# Patient Record
Sex: MALE | Race: WHITE | NOT HISPANIC OR LATINO | Employment: FULL TIME | URBAN - METROPOLITAN AREA
[De-identification: names, ages, dates, MRNs, and addresses within clinical notes are randomized per-mention and may not be internally consistent; named-entity substitution may affect disease eponyms.]

---

## 2017-02-28 ENCOUNTER — ALLSCRIPTS OFFICE VISIT (OUTPATIENT)
Dept: OTHER | Facility: OTHER | Age: 57
End: 2017-02-28

## 2017-10-17 ENCOUNTER — GENERIC CONVERSION - ENCOUNTER (OUTPATIENT)
Dept: OTHER | Facility: OTHER | Age: 57
End: 2017-10-17

## 2018-01-11 NOTE — MISCELLANEOUS
Message   Recorded as Task   Date: 10/27/2016 03:25 PM, Created By: Blessing Cleveland   Task Name: Follow Up   Assigned To: Abel Rodirguez   Regarding Patient: Jonh Asher, Status: Active   Comment:    Starr Sr - 27 Oct 2016 3:25 PM     TASK CREATED  Caller: Self; Other; [phone] (Home); [phone] (Work)  Juan A Gibson stopped in and would like his current medication changed  The prescription is Levofloxacin 500 mg    He has read the side effects and is not comfortable taking this medication    [facility]  please call Flori Cobb [phone] when ready or if you have any questions   Jenny Weinstein - 27 Oct 2016 3:50 PM     TASK REASSIGNED: Previously Assigned To [address] [place] [address] Giancarlo Mmis      please advise   ac/Abel Ibarra - 27 Oct 2016 10:32 PM     TASK EDITED                 changed        Plan  Acute maxillary sinusitis, recurrence not specified    · LevoFLOXacin 500 MG Oral Tablet   · Cefdinir 300 MG Oral Capsule; TAKE 2 CAPSULES DAILY    Signatures   Electronically signed by : Isabel Hannah DO; Oct 27 2016 10:33PM EST                       (Author)

## 2018-01-13 VITALS
HEIGHT: 71 IN | BODY MASS INDEX: 23.94 KG/M2 | HEART RATE: 76 BPM | RESPIRATION RATE: 18 BRPM | DIASTOLIC BLOOD PRESSURE: 82 MMHG | SYSTOLIC BLOOD PRESSURE: 130 MMHG | TEMPERATURE: 98.7 F | WEIGHT: 171 LBS

## 2018-02-08 PROBLEM — J39.2 THROAT DISORDER: Status: ACTIVE | Noted: 2017-02-28

## 2018-02-08 RX ORDER — BUPROPION HYDROCHLORIDE 150 MG/1
TABLET, EXTENDED RELEASE ORAL
COMMUNITY
Start: 2017-02-28 | End: 2018-02-09

## 2018-02-08 RX ORDER — RANITIDINE 150 MG/1
TABLET ORAL
COMMUNITY
Start: 2017-02-28 | End: 2018-02-09

## 2018-02-09 ENCOUNTER — OFFICE VISIT (OUTPATIENT)
Dept: FAMILY MEDICINE CLINIC | Facility: CLINIC | Age: 58
End: 2018-02-09
Payer: COMMERCIAL

## 2018-02-09 VITALS
TEMPERATURE: 97.8 F | WEIGHT: 184 LBS | HEART RATE: 80 BPM | DIASTOLIC BLOOD PRESSURE: 82 MMHG | SYSTOLIC BLOOD PRESSURE: 122 MMHG | RESPIRATION RATE: 20 BRPM | BODY MASS INDEX: 25.76 KG/M2 | HEIGHT: 71 IN

## 2018-02-09 DIAGNOSIS — I10 ESSENTIAL HYPERTENSION: ICD-10-CM

## 2018-02-09 DIAGNOSIS — Z13.1 SCREENING FOR DIABETES MELLITUS (DM): ICD-10-CM

## 2018-02-09 DIAGNOSIS — I25.110 CORONARY ARTERY DISEASE INVOLVING NATIVE CORONARY ARTERY OF NATIVE HEART WITH UNSTABLE ANGINA PECTORIS (HCC): ICD-10-CM

## 2018-02-09 DIAGNOSIS — Z12.5 PROSTATE CANCER SCREENING: ICD-10-CM

## 2018-02-09 DIAGNOSIS — E78.5 HYPERLIPIDEMIA LDL GOAL <100: ICD-10-CM

## 2018-02-09 DIAGNOSIS — K21.9 GASTROESOPHAGEAL REFLUX DISEASE, ESOPHAGITIS PRESENCE NOT SPECIFIED: Primary | ICD-10-CM

## 2018-02-09 DIAGNOSIS — Z12.11 COLON CANCER SCREENING: ICD-10-CM

## 2018-02-09 PROBLEM — I25.10 CORONARY ARTERY DISEASE INVOLVING NATIVE CORONARY ARTERY: Status: ACTIVE | Noted: 2018-02-09

## 2018-02-09 PROCEDURE — 3079F DIAST BP 80-89 MM HG: CPT | Performed by: FAMILY MEDICINE

## 2018-02-09 PROCEDURE — 99214 OFFICE O/P EST MOD 30 MIN: CPT | Performed by: FAMILY MEDICINE

## 2018-02-09 PROCEDURE — 3725F SCREEN DEPRESSION PERFORMED: CPT | Performed by: FAMILY MEDICINE

## 2018-02-09 PROCEDURE — 3074F SYST BP LT 130 MM HG: CPT | Performed by: FAMILY MEDICINE

## 2018-02-09 PROCEDURE — 3008F BODY MASS INDEX DOCD: CPT | Performed by: FAMILY MEDICINE

## 2018-02-09 RX ORDER — ASPIRIN 81 MG/1
81 TABLET ORAL DAILY
COMMUNITY

## 2018-02-09 RX ORDER — LISINOPRIL 2.5 MG/1
2.5 TABLET ORAL DAILY
COMMUNITY

## 2018-02-09 RX ORDER — CLOPIDOGREL BISULFATE 75 MG/1
75 TABLET ORAL DAILY
COMMUNITY
End: 2021-12-17 | Stop reason: ALTCHOICE

## 2018-02-09 RX ORDER — METOPROLOL TARTRATE 50 MG/1
50 TABLET, FILM COATED ORAL DAILY
COMMUNITY
End: 2021-12-17

## 2018-02-09 RX ORDER — PRAVASTATIN SODIUM 40 MG
40 TABLET ORAL DAILY
Status: ON HOLD | COMMUNITY
End: 2018-05-11

## 2018-02-09 RX ORDER — RANITIDINE 150 MG/1
150 CAPSULE ORAL 2 TIMES DAILY
Qty: 180 CAPSULE | Refills: 1 | Status: SHIPPED | OUTPATIENT
Start: 2018-02-09 | End: 2021-12-17

## 2018-02-09 NOTE — PROGRESS NOTES
Assessment/Plan:      Cad new/stable  Has stents  gerd new  Diet advised, start H2B, possible PPI and EGD in future  htn stable  Needs screening labs with f/u suggested  Get old labs from Dr Juan Boston for comparison     Diagnoses and all orders for this visit:    Gastroesophageal reflux disease, esophagitis presence not specified  -     ranitidine (ZANTAC) 150 MG capsule; Take 1 capsule (150 mg total) by mouth 2 (two) times a day  -     Ambulatory referral to Gastroenterology; Future    Hyperlipidemia LDL goal <100  -     pravastatin (PRAVACHOL) 40 mg tablet; Take 40 mg by mouth daily    Essential hypertension  -     lisinopril (ZESTRIL) 2 5 mg tablet; Take 2 5 mg by mouth daily  -     metoprolol tartrate (LOPRESSOR) 50 mg tablet; Take 50 mg by mouth every 12 (twelve) hours  -     Comprehensive metabolic panel; Future  -     Comprehensive metabolic panel    Coronary artery disease involving native coronary artery of native heart with unstable angina pectoris (HCC)  -     aspirin (ECOTRIN LOW STRENGTH) 81 mg EC tablet; Take 81 mg by mouth daily  -     clopidogrel (PLAVIX) 75 mg tablet; Take 75 mg by mouth daily  -     Comprehensive metabolic panel; Future  -     Comprehensive metabolic panel    Screening for diabetes mellitus (DM)  -     Comprehensive metabolic panel; Future  -     Hemoglobin A1c; Future  -     Comprehensive metabolic panel  -     Hemoglobin A1c    Prostate cancer screening  -     PSA, ultrasensitive; Future  -     PSA, ultrasensitive    Colon cancer screening  -     Ambulatory referral to Gastroenterology; Future    Other orders  -     Discontinue: buPROPion (ZYBAN) 150 MG 12 hr tablet; Take by mouth  -     Discontinue: ranitidine (ZANTAC) 150 mg tablet; Take by mouth        Recent data suggesting increased risk of ischemic CVA and chronic kidney damage with PPI use was advised  Possible interaction with plavix advised also      Return in about 4 weeks (around 3/9/2018) for Recheck      Subjective: Patient ID: Eneida Gold is a 56 y o  male  Chief Complaint   Patient presents with    Follow-up     on medications       Labs done by cardio  Improving per pt  No colonoscopy  No recent psa  Cad stable  htn stable  No myalgias on statin    About 4m  Since MI  gerd sx  tums w/o help  No blood in stool  No nausea  Wakes him at hs  Vomit once  No wt loss            The following portions of the patient's history were reviewed and updated as appropriate: allergies, current medications, past family history, past medical history, past social history, past surgical history and problem list     Review of Systems   Constitutional: Positive for chills  Negative for fatigue and unexpected weight change  Gastrointestinal: Negative for abdominal distention, abdominal pain and blood in stool  Endocrine: Positive for cold intolerance  Current Outpatient Prescriptions   Medication Sig Dispense Refill    aspirin (ECOTRIN LOW STRENGTH) 81 mg EC tablet Take 81 mg by mouth daily      clopidogrel (PLAVIX) 75 mg tablet Take 75 mg by mouth daily      lisinopril (ZESTRIL) 2 5 mg tablet Take 2 5 mg by mouth daily      metoprolol tartrate (LOPRESSOR) 50 mg tablet Take 50 mg by mouth every 12 (twelve) hours      pravastatin (PRAVACHOL) 40 mg tablet Take 40 mg by mouth daily      ranitidine (ZANTAC) 150 MG capsule Take 1 capsule (150 mg total) by mouth 2 (two) times a day 180 capsule 1     No current facility-administered medications for this visit  Objective:    /82 (BP Location: Left arm, Patient Position: Sitting)   Pulse 80   Temp 97 8 °F (36 6 °C)   Resp 20   Ht 5' 11" (1 803 m)   Wt 83 5 kg (184 lb)   BMI 25 66 kg/m²        Physical Exam   Constitutional: He appears well-developed  HENT:   Head: Normocephalic  Eyes: Conjunctivae are normal    Neck: Neck supple  Cardiovascular: Normal rate and intact distal pulses  Pulmonary/Chest: Effort normal  No respiratory distress     Abdominal: Soft    Musculoskeletal: He exhibits no edema or deformity  Neurological: He is alert  Skin: Skin is warm and dry  Psychiatric: His behavior is normal  Thought content normal    Nursing note and vitals reviewed               Yetta Comings, DO

## 2018-05-11 ENCOUNTER — APPOINTMENT (EMERGENCY)
Dept: RADIOLOGY | Facility: HOSPITAL | Age: 58
End: 2018-05-11
Payer: COMMERCIAL

## 2018-05-11 ENCOUNTER — APPOINTMENT (OUTPATIENT)
Dept: RADIOLOGY | Facility: HOSPITAL | Age: 58
End: 2018-05-11
Payer: COMMERCIAL

## 2018-05-11 ENCOUNTER — APPOINTMENT (OUTPATIENT)
Dept: NON INVASIVE DIAGNOSTICS | Facility: HOSPITAL | Age: 58
End: 2018-05-11
Payer: COMMERCIAL

## 2018-05-11 ENCOUNTER — HOSPITAL ENCOUNTER (OUTPATIENT)
Facility: HOSPITAL | Age: 58
Setting detail: OBSERVATION
Discharge: HOME | End: 2018-05-11
Attending: EMERGENCY MEDICINE | Admitting: STUDENT IN AN ORGANIZED HEALTH CARE EDUCATION/TRAINING PROGRAM
Payer: COMMERCIAL

## 2018-05-11 VITALS
OXYGEN SATURATION: 99 % | HEIGHT: 71 IN | HEART RATE: 63 BPM | SYSTOLIC BLOOD PRESSURE: 118 MMHG | WEIGHT: 176.81 LBS | RESPIRATION RATE: 18 BRPM | TEMPERATURE: 98.1 F | BODY MASS INDEX: 24.75 KG/M2 | DIASTOLIC BLOOD PRESSURE: 78 MMHG

## 2018-05-11 DIAGNOSIS — I25.110 CORONARY ARTERY DISEASE INVOLVING NATIVE CORONARY ARTERY OF NATIVE HEART WITH UNSTABLE ANGINA PECTORIS (HCC): Primary | ICD-10-CM

## 2018-05-11 DIAGNOSIS — R07.9 CHEST PAIN: ICD-10-CM

## 2018-05-11 DIAGNOSIS — E78.5 HYPERLIPIDEMIA LDL GOAL <100: ICD-10-CM

## 2018-05-11 PROBLEM — R07.89 ATYPICAL CHEST PAIN: Status: ACTIVE | Noted: 2018-05-11

## 2018-05-11 LAB
ALBUMIN SERPL BCP-MCNC: 3.8 G/DL (ref 3.5–5)
ALP SERPL-CCNC: 62 U/L (ref 46–116)
ALT SERPL W P-5'-P-CCNC: 43 U/L (ref 12–78)
ANION GAP SERPL CALCULATED.3IONS-SCNC: 10 MMOL/L (ref 4–13)
ANION GAP SERPL CALCULATED.3IONS-SCNC: 8 MMOL/L (ref 4–13)
AST SERPL W P-5'-P-CCNC: 20 U/L (ref 5–45)
BASOPHILS # BLD AUTO: 0 THOUSANDS/ΜL (ref 0–0.1)
BASOPHILS # BLD AUTO: 0.1 THOUSANDS/ΜL (ref 0–0.1)
BASOPHILS NFR BLD AUTO: 0 % (ref 0–1)
BASOPHILS NFR BLD AUTO: 1 % (ref 0–1)
BILIRUB SERPL-MCNC: 0.3 MG/DL (ref 0.2–1)
BUN SERPL-MCNC: 17 MG/DL (ref 5–25)
BUN SERPL-MCNC: 19 MG/DL (ref 5–25)
CALCIUM SERPL-MCNC: 9 MG/DL (ref 8.3–10.1)
CALCIUM SERPL-MCNC: 9.4 MG/DL (ref 8.3–10.1)
CHLORIDE SERPL-SCNC: 102 MMOL/L (ref 100–108)
CHLORIDE SERPL-SCNC: 102 MMOL/L (ref 100–108)
CO2 SERPL-SCNC: 26 MMOL/L (ref 21–32)
CO2 SERPL-SCNC: 29 MMOL/L (ref 21–32)
CREAT SERPL-MCNC: 1.03 MG/DL (ref 0.6–1.3)
CREAT SERPL-MCNC: 1.15 MG/DL (ref 0.6–1.3)
EOSINOPHIL # BLD AUTO: 0.4 THOUSAND/ΜL (ref 0–0.61)
EOSINOPHIL # BLD AUTO: 0.5 THOUSAND/ΜL (ref 0–0.61)
EOSINOPHIL NFR BLD AUTO: 5 % (ref 0–6)
EOSINOPHIL NFR BLD AUTO: 5 % (ref 0–6)
ERYTHROCYTE [DISTWIDTH] IN BLOOD BY AUTOMATED COUNT: 14.6 % (ref 11.6–15.1)
ERYTHROCYTE [DISTWIDTH] IN BLOOD BY AUTOMATED COUNT: 14.7 % (ref 11.6–15.1)
GFR SERPL CREATININE-BSD FRML MDRD: 71 ML/MIN/1.73SQ M
GFR SERPL CREATININE-BSD FRML MDRD: 81 ML/MIN/1.73SQ M
GLUCOSE P FAST SERPL-MCNC: 89 MG/DL (ref 65–99)
GLUCOSE SERPL-MCNC: 111 MG/DL (ref 65–140)
GLUCOSE SERPL-MCNC: 89 MG/DL (ref 65–140)
HCT VFR BLD AUTO: 42.2 % (ref 42–52)
HCT VFR BLD AUTO: 44.2 % (ref 42–52)
HGB BLD-MCNC: 13.7 G/DL (ref 14–18)
HGB BLD-MCNC: 14.3 G/DL (ref 14–18)
LYMPHOCYTES # BLD AUTO: 2.6 THOUSANDS/ΜL (ref 0.6–4.47)
LYMPHOCYTES # BLD AUTO: 3.6 THOUSANDS/ΜL (ref 0.6–4.47)
LYMPHOCYTES NFR BLD AUTO: 30 % (ref 14–44)
LYMPHOCYTES NFR BLD AUTO: 35 % (ref 14–44)
MCH RBC QN AUTO: 30.2 PG (ref 27–31)
MCH RBC QN AUTO: 30.2 PG (ref 27–31)
MCHC RBC AUTO-ENTMCNC: 32.3 G/DL (ref 31.4–37.4)
MCHC RBC AUTO-ENTMCNC: 32.4 G/DL (ref 31.4–37.4)
MCV RBC AUTO: 93 FL (ref 82–98)
MCV RBC AUTO: 93 FL (ref 82–98)
MONOCYTES # BLD AUTO: 0.8 THOUSAND/ΜL (ref 0.17–1.22)
MONOCYTES # BLD AUTO: 0.8 THOUSAND/ΜL (ref 0.17–1.22)
MONOCYTES NFR BLD AUTO: 8 % (ref 4–12)
MONOCYTES NFR BLD AUTO: 9 % (ref 4–12)
NEUTROPHILS # BLD AUTO: 4.7 THOUSANDS/ΜL (ref 1.85–7.62)
NEUTROPHILS # BLD AUTO: 5.2 THOUSANDS/ΜL (ref 1.85–7.62)
NEUTS SEG NFR BLD AUTO: 51 % (ref 43–75)
NEUTS SEG NFR BLD AUTO: 55 % (ref 43–75)
NRBC BLD AUTO-RTO: 0 /100 WBCS
NRBC BLD AUTO-RTO: 0 /100 WBCS
PLATELET # BLD AUTO: 309 THOUSANDS/UL (ref 130–400)
PLATELET # BLD AUTO: 353 THOUSANDS/UL (ref 130–400)
PMV BLD AUTO: 7.4 FL (ref 8.9–12.7)
PMV BLD AUTO: 7.5 FL (ref 8.9–12.7)
POTASSIUM SERPL-SCNC: 3.5 MMOL/L (ref 3.5–5.3)
POTASSIUM SERPL-SCNC: 3.7 MMOL/L (ref 3.5–5.3)
PROT SERPL-MCNC: 7.7 G/DL (ref 6.4–8.2)
RBC # BLD AUTO: 4.52 MILLION/UL (ref 4.7–6.1)
RBC # BLD AUTO: 4.76 MILLION/UL (ref 4.7–6.1)
SODIUM SERPL-SCNC: 138 MMOL/L (ref 136–145)
SODIUM SERPL-SCNC: 139 MMOL/L (ref 136–145)
TROPONIN I SERPL-MCNC: <0.02 NG/ML
WBC # BLD AUTO: 10.2 THOUSAND/UL (ref 4.8–10.8)
WBC # BLD AUTO: 8.6 THOUSAND/UL (ref 4.8–10.8)

## 2018-05-11 PROCEDURE — 85025 COMPLETE CBC W/AUTO DIFF WBC: CPT | Performed by: STUDENT IN AN ORGANIZED HEALTH CARE EDUCATION/TRAINING PROGRAM

## 2018-05-11 PROCEDURE — 36415 COLL VENOUS BLD VENIPUNCTURE: CPT | Performed by: EMERGENCY MEDICINE

## 2018-05-11 PROCEDURE — 71045 X-RAY EXAM CHEST 1 VIEW: CPT

## 2018-05-11 PROCEDURE — 99285 EMERGENCY DEPT VISIT HI MDM: CPT

## 2018-05-11 PROCEDURE — A9502 TC99M TETROFOSMIN: HCPCS

## 2018-05-11 PROCEDURE — 80048 BASIC METABOLIC PNL TOTAL CA: CPT | Performed by: STUDENT IN AN ORGANIZED HEALTH CARE EDUCATION/TRAINING PROGRAM

## 2018-05-11 PROCEDURE — 80053 COMPREHEN METABOLIC PANEL: CPT | Performed by: EMERGENCY MEDICINE

## 2018-05-11 PROCEDURE — 99204 OFFICE O/P NEW MOD 45 MIN: CPT | Performed by: INTERNAL MEDICINE

## 2018-05-11 PROCEDURE — 93005 ELECTROCARDIOGRAM TRACING: CPT

## 2018-05-11 PROCEDURE — 85025 COMPLETE CBC W/AUTO DIFF WBC: CPT | Performed by: EMERGENCY MEDICINE

## 2018-05-11 PROCEDURE — 93017 CV STRESS TEST TRACING ONLY: CPT

## 2018-05-11 PROCEDURE — 99219 PR INITIAL OBSERVATION CARE/DAY 50 MINUTES: CPT | Performed by: STUDENT IN AN ORGANIZED HEALTH CARE EDUCATION/TRAINING PROGRAM

## 2018-05-11 PROCEDURE — 78452 HT MUSCLE IMAGE SPECT MULT: CPT

## 2018-05-11 PROCEDURE — 84484 ASSAY OF TROPONIN QUANT: CPT | Performed by: EMERGENCY MEDICINE

## 2018-05-11 PROCEDURE — 84484 ASSAY OF TROPONIN QUANT: CPT | Performed by: STUDENT IN AN ORGANIZED HEALTH CARE EDUCATION/TRAINING PROGRAM

## 2018-05-11 PROCEDURE — 87081 CULTURE SCREEN ONLY: CPT | Performed by: FAMILY MEDICINE

## 2018-05-11 RX ORDER — ASPIRIN 81 MG/1
81 TABLET ORAL DAILY
Status: DISCONTINUED | OUTPATIENT
Start: 2018-05-11 | End: 2018-05-11 | Stop reason: HOSPADM

## 2018-05-11 RX ORDER — PRAVASTATIN SODIUM 40 MG
40 TABLET ORAL DAILY
Status: DISCONTINUED | OUTPATIENT
Start: 2018-05-11 | End: 2018-05-11 | Stop reason: HOSPADM

## 2018-05-11 RX ORDER — FAMOTIDINE 20 MG/1
20 TABLET, FILM COATED ORAL DAILY
Status: DISCONTINUED | OUTPATIENT
Start: 2018-05-11 | End: 2018-05-11 | Stop reason: HOSPADM

## 2018-05-11 RX ORDER — METOPROLOL TARTRATE 50 MG/1
50 TABLET, FILM COATED ORAL DAILY
Status: DISCONTINUED | OUTPATIENT
Start: 2018-05-12 | End: 2018-05-11 | Stop reason: HOSPADM

## 2018-05-11 RX ORDER — LISINOPRIL 2.5 MG/1
2.5 TABLET ORAL DAILY
Status: DISCONTINUED | OUTPATIENT
Start: 2018-05-11 | End: 2018-05-11 | Stop reason: HOSPADM

## 2018-05-11 RX ORDER — HEPARIN SODIUM 5000 [USP'U]/ML
5000 INJECTION, SOLUTION INTRAVENOUS; SUBCUTANEOUS EVERY 8 HOURS SCHEDULED
Status: DISCONTINUED | OUTPATIENT
Start: 2018-05-11 | End: 2018-05-11 | Stop reason: HOSPADM

## 2018-05-11 RX ORDER — ASPIRIN 325 MG
325 TABLET ORAL ONCE
Status: COMPLETED | OUTPATIENT
Start: 2018-05-11 | End: 2018-05-11

## 2018-05-11 RX ORDER — METOPROLOL TARTRATE 50 MG/1
50 TABLET, FILM COATED ORAL EVERY 12 HOURS SCHEDULED
Status: DISCONTINUED | OUTPATIENT
Start: 2018-05-11 | End: 2018-05-11

## 2018-05-11 RX ORDER — NITROGLYCERIN 0.4 MG/1
0.4 TABLET SUBLINGUAL
Status: COMPLETED | OUTPATIENT
Start: 2018-05-11 | End: 2018-05-11

## 2018-05-11 RX ORDER — CLOPIDOGREL BISULFATE 75 MG/1
75 TABLET ORAL DAILY
Status: DISCONTINUED | OUTPATIENT
Start: 2018-05-11 | End: 2018-05-11 | Stop reason: HOSPADM

## 2018-05-11 RX ORDER — SODIUM CHLORIDE 9 MG/ML
50 INJECTION, SOLUTION INTRAVENOUS CONTINUOUS
Status: DISCONTINUED | OUTPATIENT
Start: 2018-05-11 | End: 2018-05-11 | Stop reason: HOSPADM

## 2018-05-11 RX ORDER — PRAVASTATIN SODIUM 40 MG
80 TABLET ORAL DAILY
Qty: 60 TABLET | Refills: 0
Start: 2018-05-11 | End: 2019-03-11 | Stop reason: HOSPADM

## 2018-05-11 RX ADMIN — HEPARIN SODIUM 5000 UNITS: 5000 INJECTION, SOLUTION INTRAVENOUS; SUBCUTANEOUS at 09:53

## 2018-05-11 RX ADMIN — NITROGLYCERIN 0.4 MG: 0.4 TABLET SUBLINGUAL at 02:20

## 2018-05-11 RX ADMIN — ASPIRIN 325 MG: 325 TABLET ORAL at 02:30

## 2018-05-11 RX ADMIN — METOPROLOL TARTRATE 50 MG: 50 TABLET ORAL at 12:40

## 2018-05-11 RX ADMIN — LISINOPRIL 2.5 MG: 2.5 TABLET ORAL at 12:41

## 2018-05-11 RX ADMIN — ASPIRIN 81 MG: 81 TABLET, COATED ORAL at 09:56

## 2018-05-11 RX ADMIN — CLOPIDOGREL BISULFATE 75 MG: 75 TABLET ORAL at 09:55

## 2018-05-11 RX ADMIN — SODIUM CHLORIDE 50 ML/HR: 0.9 INJECTION, SOLUTION INTRAVENOUS at 09:50

## 2018-05-11 RX ADMIN — NITROGLYCERIN 0.4 MG: 0.4 TABLET SUBLINGUAL at 02:15

## 2018-05-11 RX ADMIN — NITROGLYCERIN 0.4 MG: 0.4 TABLET SUBLINGUAL at 02:25

## 2018-05-11 RX ADMIN — PRAVASTATIN SODIUM 40 MG: 40 TABLET ORAL at 09:56

## 2018-05-11 RX ADMIN — FAMOTIDINE 20 MG: 20 TABLET ORAL at 09:56

## 2018-05-11 NOTE — PLAN OF CARE
Problem: DISCHARGE PLANNING - CARE MANAGEMENT  Goal: Discharge to post-acute care or home with appropriate resources  INTERVENTIONS:  - Conduct assessment to determine patient/family and health care team treatment goals, and need for post-acute services based on payer coverage, community resources, and patient preferences, and barriers to discharge  - Address psychosocial, clinical, and financial barriers to discharge as identified in assessment in conjunction with the patient/family and health care team  - Arrange appropriate level of post-acute services according to patient's   needs and preference and payer coverage in collaboration with the physician and health care team  - Communicate with and update the patient/family, physician, and health care team regarding progress on the discharge plan  - Arrange appropriate transportation to post-acute venues  Return to home independently  Outcome: Progressing

## 2018-05-11 NOTE — ED NOTES
Patient resting quietly at this time  Side rails up x 2  States "I feel good right now "  Denies pain        Marvel Gale, RN  05/11/18 9774

## 2018-05-11 NOTE — CASE MANAGEMENT
Initial Clinical Review    Admission: Date/Time/Statement: 5/11/18 0243    Orders Placed This Encounter   Procedures    Place in Observation (expected length of stay for this patient is less than two midnights)     Standing Status:   Standing     Number of Occurrences:   1     Order Specific Question:   Admitting Physician     Answer:   Mary Almendarez [[id number]]     Order Specific Question:   Level of Care     Answer:   Med Surg [16]         ED: Date/Time/Mode of Arrival:   ED Arrival Information     Expected Arrival Acuity Means of Arrival Escorted By Service Admission Type    - 5/11/2018 01:32 Urgent Walk-In Family Member General Medicine Urgent    Arrival Complaint    left arm pain          Chief Complaint:   Chief Complaint   Patient presents with    Arm Pain     Pt c/o intermittant  left arm pain started 20 minutes ago  History of MI in October  History of Illness: Javan Schirmer is a 56 y o  male with a PMH of CAD s/p stents, HTN, HLD and GERD who presents with left forearm pain  He states that he was sleeping when he suddenly woke up with left forearm pain  It was rated 7/10 on the pain scale  It was not associated with any shortness of breath or chest pain  He was very anxious as he had an MI which presented with chest pain and similar left forearm pain in Oct 2017 at which time he had 2 cardiac stents placed  He also reported a milder version of left forearm pain at rest yesterday  In the ER he received NTG x3 and his pain went jordan to a 2/10  Currently he denies any chest pain, shortness of breath or weakness/numbness in left arm  No other complaints or concerns      ED Vital Signs:   ED Triage Vitals [05/11/18 0136]   Temperature Pulse Respirations Blood Pressure SpO2   98 8 °F (37 1 °C) 87 16 167/90 97 %      Temp Source Heart Rate Source Patient Position - Orthostatic VS BP Location FiO2 (%)   Tympanic Monitor Sitting Right arm --      Pain Score       2        Wt Readings from Last 1 Encounters:   05/11/18 79 4 kg (175 lb)       Vital Signs (abnormal): Abnormal Labs/Diagnostic Test Results: TROPONIN<0 02 X3   CXR  No acute cardiopulmonary disease  ED Treatment:   Medication Administration from 05/11/2018 0132 to 05/11/2018 0809       Date/Time Order Dose Route Action Action by Comments     05/11/2018 0225 nitroglycerin (NITROSTAT) SL tablet 0 4 mg 0 4 mg Sublingual Given Reanna Browning RN      05/11/2018 0220 nitroglycerin (NITROSTAT) SL tablet 0 4 mg 0 4 mg Sublingual Given Reanna Browning RN      05/11/2018 0215 nitroglycerin (NITROSTAT) SL tablet 0 4 mg 0 4 mg Sublingual Given Reanna Browning RN      05/11/2018 0230 aspirin tablet 325 mg 325 mg Oral Given Reanna Browning RN           Past Medical/Surgical History: Active Ambulatory Problems     Diagnosis Date Noted    Esophageal reflux 09/04/2012    Throat disorder 02/28/2017    Coronary artery disease involving native coronary artery 02/09/2018    Hyperlipidemia LDL goal <100 02/09/2018    Essential hypertension 02/09/2018    Prostate cancer screening 02/09/2018    Screening for diabetes mellitus (DM) 02/09/2018    Colon cancer screening 02/09/2018     Resolved Ambulatory Problems     Diagnosis Date Noted    Nicotine dependence 09/04/2012     Past Medical History:   Diagnosis Date    Complete tear of rotator cuff 04/01/2008    Coronary artery disease     Hx of heart artery stent     Hypertension     [facility]        Admitting Diagnosis: Arm pain [M79 603]  Chest pain [R07 9]  Coronary artery disease involving native coronary artery of native heart with unstable angina pectoris ([facility] ) [I25 110]    Age/Sex: 56 y o  male    Assessment/Plan:   Atypical chest pain with hx of CAD s/p stents   Assessment & Plan     Pt reports his anginal equivalent which is left forearm pain similar to prior cardiac event  EKG not concerning for ischemia  Initial troponin is WNL    Will admit for Observation, monitor on telemetry, trend troponin, keep NPO, order stress test, resume Asprin, Plavix, Metoprolol, Lisinopril and Statin and have Cardiology evaluate the pt           Essential hypertension   Assessment & Plan     Continue Metoprolol and Lisinopril           Hyperlipidemia LDL goal <100   Assessment & Plan     Continue Statin        Prophylaxis: Heparin   Code Status: No Order     Anticipated Length of Stay:  Patient will be admitted on an Observation basis with an anticipated length of stay of atleast 1 midnights           Admission Orders:  OBSERVATION   TELE MON  SERIAL TROPONIN  CONSULT CARDIO  STRESS TEST  CBC BMP   DAILY WEIGHT I/O  Scheduled Meds:   Current Facility-Administered Medications:  aspirin 81 mg Oral Daily Sherice Ferguson MD   clopidogrel 75 mg Oral Daily Sherice Ferguson MD   famotidine 20 mg Oral Daily Sherice Ferguson MD   heparin (porcine) 5,000 Units Subcutaneous Q8H [address] Sherice Ferguson MD   lisinopril 2 5 mg Oral Daily Sherice Ferguson MD   metoprolol tartrate 50 mg Oral Q12H [address] Sherice Ferguson MD   pravastatin 40 mg Oral Daily Sherice Ferguson MD   sodium chloride 50 mL/hr Intravenous Continuous Sherice Ferguson MD     Continuous Infusions:   sodium chloride 50 mL/hr     PRN Meds:

## 2018-05-11 NOTE — NURSING NOTE
Pt discharged home with significant other  Pt ambulatory upon discharge, gait steady  Discharge instructions and mediation list reviewed  No questions at this time  Follow up appointments reviewed and encouraged to keep  IV D/C intact

## 2018-05-11 NOTE — DISCHARGE SUMMARY
Discharge Summary - [facility]    Patient Information: Jana Phalen 56 y o  male MRN: [id number]  Unit/Bed#: [address] [place] [address] Encounter: [id number]    Discharging Physician / Practitioner: Gerardo Mendez DO  PCP: Saira Parrish MD  Admission Date: 5/11/2018  Discharge Date: 05/11/18    Reason for Admission: Arm Pain (Pt c/o intermittant  left arm pain started 20 minutes ago  History of MI in October )      Discharge Diagnoses:     Principal Problem:    Atypical chest pain with hx of CAD s/p stents  Active Problems:    Hyperlipidemia LDL goal <100    Essential hypertension  Resolved Problems:    * No resolved hospital problems  *        * Atypical chest pain with hx of CAD s/p stents   Assessment & Plan    Patient seen by Cardiology  Troponins were negative  He underwent nuclear stress test which was normal     Continue Asprin, Plavix, Metoprolol, Lisinopril as he uses at home  Outpatient follow up with own cardiologist after discharge        Essential hypertension   Assessment & Plan    Continue Metoprolol and Lisinopril  Hyperlipidemia LDL goal <100   Assessment & Plan    Dose of Pravachol increased to 80 mg daily as per Cardiology recommendations            Consultations During Hospital Stay:  [address]    Procedures Performed:     · Stress test    Significant Findings:     · See hospital course    Imaging while in hospital:    Xr Chest 1 View Portable    Result Date: 5/11/2018  Narrative: CHEST INDICATION:   chest pain  Arm pain COMPARISON:  02/17/2013 EXAM PERFORMED/VIEWS:  XR CHEST PORTABLE 1 image FINDINGS: Cardiomediastinal silhouette appears unremarkable  No infiltrates  Biapical scarring  No evidence of heart failure  No pleural effusions  No pneumothorax  Osseous structures appear within normal limits for patient age  Impression: No acute cardiopulmonary disease   Workstation performed: [id number]       Incidental Findings:   · none    Test Results Pending at Discharge (will require follow up):   · As per After Visit Summary     Outpatient Tests Requested:  · none    Complications:  See hospital course and above    Hospital Course:     Valentina Benedict is a 56 y o  male patient who originally presented to the hospital on 5/11/2018 due to left forearm pain  it was 7/10 in intensity  Denied any shortness of breath, chest pain  patient was very anxious as he had an MI in the past which presented with chest pain and similar left forearm pain  Patient was admitted and seen by Cardiology  He underwent a stress test which was normal   He was cleared by Cardiology prior to discharge home  Discharge plan was discussed with patient and roommate that was present at bedside (okay'ed by patient)    Please see above list of diagnoses and related plan for additional information  Condition at Discharge: stable     Discharge Day Visit / Exam:     Subjective:  Denies any left arm pain, chest pain, shortness of breath since admission    Vitals: Blood Pressure: 118/78 (05/11/18 1351)  Pulse: 63 (05/11/18 1351)  Temperature: 98 1 °F (36 7 °C) (05/11/18 1351)  Temp Source: Oral (05/11/18 1351)  Respirations: 18 (05/11/18 1351)  Height: 5' 11" (180 3 cm) (05/11/18 0831)  Weight - Scale: 80 2 kg (176 lb 12 9 oz) (80 2 kg) (05/11/18 0831)  SpO2: 99 % (05/11/18 1351)  Exam:   Physical Exam   Constitutional: He is oriented to person, place, and time  He appears well-developed and well-nourished  No distress  HENT:   Head: Normocephalic and atraumatic  Mouth/Throat: Oropharynx is clear and moist    Eyes: EOM are normal  Right eye exhibits no discharge  Left eye exhibits no discharge  No scleral icterus  Neck: Neck supple  No tracheal deviation present  Cardiovascular: Normal rate and regular rhythm  Pulmonary/Chest: Effort normal and breath sounds normal  No respiratory distress  He has no wheezes  He has no rales  Abdominal: Soft   Bowel sounds are normal  He exhibits no distension  There is no tenderness  Musculoskeletal: He exhibits no edema  Neurological: He is alert and oriented to person, place, and time  No cranial nerve deficit  Skin: He is not diaphoretic  Psychiatric: He has a normal mood and affect  Discharge instructions/Information to patient and family:(Discharge Medications and Follow up):   See after visit summary for information provided to patient and family  Provisions for Follow-Up Care:  See after visit summary for information related to follow-up care and any pertinent home health orders  Disposition: Home    Planned Readmission:  No     Discharge Statement:  I spent 30 minutes discharging the patient  This time was spent on the day of discharge  I had direct contact with the patient on the day of discharge  Greater than 50% of the total time was spent examining patient, answering all patient questions, arranging and discussing plan of care with patient as well as directly providing post-discharge instructions  Additional time then spent on discharge activities  Discharge Medications:  See after visit summary for reconciled discharge medications provided to patient and family  ** Please Note:  Dictation voice to text software may have been used in the creation of this document   **

## 2018-05-11 NOTE — PLAN OF CARE
CARDIOVASCULAR - ADULT     Maintains optimal cardiac output and hemodynamic stability Progressing     Absence of cardiac dysrhythmias or at baseline rhythm Progressing        INFECTION - ADULT     Absence or prevention of progression during hospitalization Progressing     Absence of fever/infection during neutropenic period Progressing        PAIN - ADULT     Verbalizes/displays adequate comfort level or baseline comfort level Progressing        Potential for Falls     Patient will remain free of falls Progressing        SAFETY ADULT     Maintain or return to baseline ADL function Progressing     Maintain or return mobility status to optimal level Progressing

## 2018-05-11 NOTE — H&P
History and Physical - [facility] Internal Medicine    Patient Information: Alie Delgadillo 56 y o  male MRN: [id number]  Unit/Bed#: ED 10 Encounter: [id number]  Admitting Physician: Nova Zimmer MD  PCP: Jamila Alexander MD  Date of Admission:  05/11/18    Chief Complaint:     Left forearm pain   of Present Illness:    Alie Delgadillo is a 56 y o  male with a PMH of CAD s/p stents, HTN, HLD and GERD who presents with left forearm pain  He states that he was sleeping when he suddenly woke up with left forearm pain  It was rated 7/10 on the pain scale  It was not associated with any shortness of breath or chest pain  He was very anxious as he had an MI which presented with chest pain and similar left forearm pain in Oct 2017 at which time he had 2 cardiac stents placed  He also reported a milder version of left forearm pain at rest yesterday  In the ER he received NTG x3 and his pain went jordan to a 2/10  Currently he denies any chest pain, shortness of breath or weakness/numbness in left arm  No other complaints or concerns  Review of Systems:    Review of Systems   Constitutional: Negative for fever  Respiratory: Negative for shortness of breath  Cardiovascular: Negative for chest pain and leg swelling  Gastrointestinal: Negative for abdominal pain and blood in stool  Genitourinary: Negative for hematuria  Musculoskeletal: Positive for arthralgias  Neurological: Negative for syncope  Psychiatric/Behavioral: Negative for confusion  Past Medical and Surgical History:     Past Medical History:   Diagnosis Date    Complete tear of rotator cuff 04/01/2008    Hx of heart artery stent     MI (myocardial infarction) ([facility] )        History reviewed  No pertinent surgical history  Meds/Allergies:    PTA meds:   Prior to Admission Medications   Prescriptions Last Dose Informant Patient Reported?  Taking?   aspirin (ECOTRIN LOW STRENGTH) 81 mg EC tablet 5/10/2018 at Unknown time  Yes Yes   Sig: Take 81 mg by mouth daily   clopidogrel (PLAVIX) 75 mg tablet 5/10/2018 at Unknown time  Yes Yes   Sig: Take 75 mg by mouth daily   lisinopril (ZESTRIL) 2 5 mg tablet 5/10/2018 at Unknown time  Yes Yes   Sig: Take 2 5 mg by mouth daily   metoprolol tartrate (LOPRESSOR) 50 mg tablet 5/10/2018 at Unknown time  Yes Yes   Sig: Take 50 mg by mouth every 12 (twelve) hours   pravastatin (PRAVACHOL) 40 mg tablet 5/10/2018 at Unknown time  Yes Yes   Sig: Take 40 mg by mouth daily   ranitidine (ZANTAC) 150 MG capsule 5/10/2018 at Unknown time  No Yes   Sig: Take 1 capsule (150 mg total) by mouth 2 (two) times a day      Facility-Administered Medications: None       Allergies: No Known Allergies  History:     Marital Status: Single   History   Alcohol Use No     Comment: occasional     History   Smoking Status    Former Smoker   Smokeless Tobacco    Never Used     Comment: per allscripts current every day smoker     History   Drug Use No       Family History: Mother: Leukemia  Father: Lung Cancer    Physical Exam:     Vitals:   Blood Pressure: 122/69 (05/11/18 0225)  Pulse: 83 (05/11/18 0225)  Temperature: 98 8 °F (37 1 °C) (05/11/18 0136)  Temp Source: Tympanic (05/11/18 0136)  Respirations: 16 (05/11/18 0225)  Height: 5' 11" (180 3 cm) (05/11/18 0136)  Weight - Scale: 79 4 kg (175 lb) (05/11/18 0136)  SpO2: 95 % (05/11/18 0225)    Physical Exam:   General: in no acute distress  HEENT: atraumatic, normocephalic  Skin: no jaundice  CVS: RRR, murmur appreciated  Lungs: CTAL, no wheezing or rales appreciated  Abdomen: soft, nondistended, bowel sounds normal, nontender upon palpation, no guarding or rebound tenderness  Extremities: no edema, no calf swelling or tenderness  Neuro: alert and oriented x3, normal handgrip strength bilaterally, sensation intact in all extremities, muscle strength 5/5 in both arms   Psych: slightly anxious       Lab Results: I have personally reviewed pertinent reports          Results from last 7 days  Lab Units 05/11/18  0142   WBC Thousand/uL 10 20   HEMOGLOBIN g/dL 14 3   HEMATOCRIT % 44 2   PLATELETS Thousands/uL 353   NEUTROS PCT % 51   LYMPHS PCT % 35   MONOS PCT % 8   EOS PCT % 5       Results from last 7 days  Lab Units 05/11/18  0142   SODIUM mmol/L 139   POTASSIUM mmol/L 3 5   CHLORIDE mmol/L 102   CO2 mmol/L 29   BUN mg/dL 19   CREATININE mg/dL 1 15   CALCIUM mg/dL 9 4   TOTAL PROTEIN g/dL 7 7   BILIRUBIN TOTAL mg/dL 0 30   ALK PHOS U/L 62   ALT U/L 43   AST U/L 20   GLUCOSE RANDOM mg/dL 111           Imaging:     No results found  Assessment/Plan    * Atypical chest pain with hx of CAD s/p stents   Assessment & Plan    Pt reports his anginal equivalent which is left forearm pain similar to prior cardiac event  EKG not concerning for ischemia  Initial troponin is WNL  Will admit for Observation, monitor on telemetry, trend troponin, keep NPO, order stress test, resume Asprin, Plavix, Metoprolol, Lisinopril and Statin and have Cardiology evaluate the pt         Essential hypertension   Assessment & Plan    Continue Metoprolol and Lisinopril         Hyperlipidemia LDL goal <100   Assessment & Plan    Continue Statin             Hospital Problem List:     Principal Problem:    Atypical chest pain with hx of CAD s/p stents  Active Problems:    Hyperlipidemia LDL goal <100    Essential hypertension        VTE Prophylaxis: Heparin   Code Status: No Order    Anticipated Length of Stay:  Patient will be admitted on an Observation basis with an anticipated length of stay of atleast 1 midnights  Total Time for Visit, including Counseling / Coordination of Care: 30 minutes  Greater than 50% of this total time spent on direct patient counseling and coordination of care

## 2018-05-11 NOTE — CONSULTS
Consultation - Cardiology   Matt Casas 56 y o  male MRN: [id number]  Unit/Bed#: 2 [address] 185-82 Encounter: [id number]  05/11/18  5:51 PM          Physician Requesting Consult: Christina Miranda DO  Reason for Consult / Principal Problem: Chest pain      Assessment:  1  Chest pain - previous anginal pain was arm pain - stress test done today did not show any ischemia  2  Hypertension - BP stable  3  Dyslipidemia - statin    Plan:  Discussed risk factor reduction including refraining from smoking, eating a diet high in fruits and vegetables, maintaining a healthy weight, limiting screen time along with controlling BP and cholesterol  Encouraged to exercise 150 minutes a week at a moderate level such as a fast walk or 75 minutes of high intensity  History of Present Illness   HPI: Matt Casas is a 56y o  year old male who presents with pain in his left arm  Pain was present upon awakening yesterday AM   He became concerned because pain was similar to previous anginal pain he felt prior to his heart attack that occurred in October 2017  There was no shortness of breath, palpitations and did not have any significant chest pain  Arm pain improved with nitroglycerine given in ER  He is feeling well today  He denies LE edema, orthopnea or PND  Review of Systems:    Review of Systems   Constitutional: Negative for chills, fatigue and fever  HENT: Negative for congestion, nosebleeds and postnasal drip  Respiratory: Negative for cough, chest tightness and shortness of breath  Cardiovascular: Positive for chest pain  Negative for palpitations and leg swelling  Gastrointestinal: Negative for abdominal distention, abdominal pain, diarrhea, nausea and vomiting  Endocrine: Negative for polydipsia, polyphagia and polyuria  Musculoskeletal: Positive for myalgias  Negative for gait problem  Skin: Negative for color change, pallor and rash     Allergic/Immunologic: Negative for environmental allergies, food allergies and immunocompromised state  Neurological: Negative for dizziness, seizures, syncope and light-headedness  Hematological: Negative for adenopathy  Does not bruise/bleed easily  Psychiatric/Behavioral: Negative for dysphoric mood  The patient is not nervous/anxious  Historical Information   Past Medical History:   Diagnosis Date    Complete tear of rotator cuff 04/01/2008    Coronary artery disease     Hx of heart artery stent     Hypertension     MI (myocardial infarction) ([address] )      Past Surgical History:   Procedure Laterality Date    CARDIAC SURGERY       History   Alcohol Use No     Comment: occasional     History   Drug Use No     History   Smoking Status    Former Smoker    Packs/day: 1 50    Quit date: 10/11/2017   Smokeless Tobacco    Never Used     Comment: per allscripts current every day smoker       Family History:   Family History   Problem Relation Age of Onset    Leukemia Mother     Cancer Mother     Cancer Father        Meds/Allergies   current meds:   No current facility-administered medications for this encounter  No Known Allergies    Objective   Vitals: Blood pressure 118/78, pulse 63, temperature 98 1 °F (36 7 °C), temperature source Oral, resp  rate 18, height 5' 11" (1 803 m), weight 80 2 kg (176 lb 12 9 oz), SpO2 99 %  , Body mass index is 24 66 kg/m²  Physical Exam   Constitutional: He appears healthy  No distress  HENT:   Nose: Nose normal    Mouth/Throat: Oropharynx is clear  Neck: Neck supple  No JVD present  Cardiovascular: Normal rate and regular rhythm  Exam reveals no distant heart sounds and no friction rub  No murmur heard  Pulmonary/Chest: Effort normal and breath sounds normal  He has no wheezes  He has no rales  He exhibits no tenderness  Abdominal: Soft  He exhibits no distension  There is no tenderness  Musculoskeletal: He exhibits no edema     Neurological: He is alert and oriented to person, place, and time    Skin: Skin is warm and dry  No rash noted  Lab Results:     Troponins:   Results from last 7 days  Lab Units 18  1320 18  0748 18  0444   TROPONIN I ng/mL <0 02 <0 02 <0 02       CBC with diff:   Results from last 7 days  Lab Units 18  1320 18  0142   WBC Thousand/uL 8 60 10 20   HEMOGLOBIN g/dL 13 7* 14 3   HEMATOCRIT % 42 2 44 2   MCV fL 93 93   PLATELETS Thousands/uL 309 353   MCH pg 30 2 30 2   MCHC g/dL 32 3 32 4   RDW % 14 6 14 7   MPV fL 7 5* 7 4*   NRBC AUTO /100 WBCs 0 0       CMP:   Results from last 7 days  Lab Units 18  1320 18  0142   SODIUM mmol/L 138 139   POTASSIUM mmol/L 3 7 3 5   CHLORIDE mmol/L 102 102   CO2 mmol/L 26 29   ANION GAP mmol/L 10 8   BUN mg/dL 17 19   CREATININE mg/dL 1 03 1 15   GLUCOSE RANDOM mg/dL 89 111   CALCIUM mg/dL 9 0 9 4   AST U/L  --  20   ALT U/L  --  43   ALK PHOS U/L  --  62   TOTAL PROTEIN g/dL  --  7 7   BILIRUBIN TOTAL mg/dL  --  0 30   EGFR ml/min/1 73sq m 81 71       Magnesium:       Coags:       Lipid Profile:         Cardiac testing:             Results for orders placed during the hospital encounter of 18   NM Myocardial Perfusion Spect (Pharmacological Induced Stress and/or Rest)    Narrative [facility] [address] [place], [address]  [phone]    Rest/Stress Gated SPECT Myocardial Perfusion Imaging After Exercise    Patient: Yesi Maurer  MR number: [id number]  Account number: [de-identified]  : 1960  Age: 56 years  Gender: Male  Status: Outpatient  Location: Stress lab  Height: 71 in  Weight: 175 lb  BP: 116/ 80 mmHg    Allergies: NO KNOWN ALLERGIES    Diagnosis: R07 9 - Chest pain, unspecified    Primary Physician:  Vikki Ordonez MD  RN:  SILVER Prado  Group:  Bina Guzman  Report Prepared By[de-identified]  SILVER Prado  Interpreting Physician:  Janes Kirstin, DO    INDICATIONS: Evaluation of known coronary artery disease      HISTORY: The patient is a 56year old  male  Chest pain status: left forearm pain  Coronary artery disease risk factors: dyslipidemia, hypertension, and family history of premature coronary artery disease  Cardiovascular history:  coronary artery disease and prior myocardial infarction  Prior cardiovascular procedures: percutaneous transluminal coronary angioplasty  PHYSICAL EXAM: Baseline physical exam screening: normal     REST ECG: Normal sinus rhythm  Normal baseline ECG  PROCEDURE: The study was performed in the stress lab  Treadmill exercise testing was performed, using the Jesus protocol  Gated SPECT myocardial perfusion imaging was performed after stress and at rest  Systolic blood pressure was 116  mmHg, at the start of the study  Diastolic blood pressure was 80 mmHg, at the start of the study  The heart rate was 68 bpm, at the start of the study  Patient was not experiencing chest pain at the time of the injection of the  radiopharmaceutical  IV double checked  JESUS PROTOCOL:  HR bpm SBP mmHg DBP mmHg Symptoms ST change Rhythm/conduct  Baseline 68 116 80 none none NSR, no ectopy, NSR  Stage 1 100 124 80 none -- --  Stage 2 116 142 82 none -- --  Stage 3 146 -- -- moderate fatigue -- --  Immediate 131 160 82 same as above -- --  Recovery 1 94 124 78 subsiding -- --  Recovery 2 88 112 70 none -- --  No medications or fluids given  STRESS SUMMARY: Duration of exercise was 7 min and 55 sec  The patient exercised to protocol stage 3  Maximal work rate was 10 1 METs  Functional capacity was normal  Maximal heart rate during stress was 146 bpm ( 88 % of maximal predicted  heart rate)  The heart rate response to stress was normal  There was normal resting blood pressure with an appropriate response to stress  The rate-pressure product for the peak heart rate and blood pressure was 11609  There was no chest  pain during stress  The stress test was terminated due to achievement of target heart rate and moderate fatigue  Pre oxygen saturation: 100 %  Peak oxygen saturation: 99 %  The stress ECG was negative for ischemia and normal  There were no  stress arrhythmias or conduction abnormalities  ISOTOPE ADMINISTRATION:  Resting isotope administration Stress isotope administration  Agent Sestamibi Sestamibi  Dose 10 8 mCi 31 2 mCi  Date 05/11/2018 05/11/2018    The radiopharmaceutical was injected at the peak effect of pharmacologic stress  Radiopharmaceutical was administered 7 min, 44 sec into the stress protocol  There was 1 min of exercise after the injection  MYOCARDIAL PERFUSION IMAGING:  The image quality was good  Left ventricular size was normal     PERFUSION DEFECTS:  -  There were no perfusion defects  GATED SPECT:  The calculated left ventricular ejection fraction was 64 %  Left ventricular ejection fraction was within normal limits by visual estimate  There was no diagnostic evidence for left ventricular regional abnormality  SUMMARY:  -  Stress results: Duration of exercise was 7 min and 55 sec  Target heart rate was achieved  There was no chest pain during stress  -  ECG conclusions: The stress ECG was negative for ischemia and normal   -  Perfusion imaging: There were no perfusion defects   -  Gated SPECT: The calculated left ventricular ejection fraction was 64 %  Left ventricular ejection fraction was within normal limits by visual estimate  There was no diagnostic evidence for left ventricular regional abnormality  IMPRESSIONS: Normal study after maximal exercise  Myocardial perfusion imaging was normal at rest and with stress   Left ventricular systolic function was normal     Prepared and signed by    Adilson Person DO  Signed 05/11/2018 17:40:39           EKG: Personally reviewed: NSR with no ST abnormalities    Imaging: I have personally reviewed pertinent films in PACS

## 2018-05-11 NOTE — SOCIAL WORK
DASH discussion completed  Discussed goals of making sure pt's needs are met upon discharge, pt's preferences are taken into account, pt understands her health condition, medications and symptoms to watch for after returning home and pt is aware of any follow up appointments recommended by hospital physician  CM spoke with the pt at the bedside  Pt lives with his S O  and is independent with his own care  Pt denies any DME or HHC, no DCP needs noted    Pt does drive and uses the [employer]

## 2018-05-11 NOTE — ASSESSMENT & PLAN NOTE
Patient seen by Cardiology  Troponins were negative  He underwent nuclear stress test which was normal     Continue Asprin, Plavix, Metoprolol, Lisinopril as he uses at home    Outpatient follow up with own cardiologist after discharge

## 2018-05-12 LAB
ATRIAL RATE: 72 BPM
MRSA NOSE QL CULT: NORMAL
P AXIS: 58 DEGREES
PR INTERVAL: 128 MS
QRS AXIS: 72 DEGREES
QRSD INTERVAL: 104 MS
QT INTERVAL: 388 MS
QTC INTERVAL: 424 MS
T WAVE AXIS: 34 DEGREES
VENTRICULAR RATE: 72 BPM

## 2018-05-12 PROCEDURE — 93010 ELECTROCARDIOGRAM REPORT: CPT | Performed by: INTERNAL MEDICINE

## 2018-05-14 LAB
CHEST PAIN STATEMENT: NORMAL
MAX DIASTOLIC BP: 82 MMHG
MAX HEART RATE: 146 BPM
MAX PREDICTED HEART RATE: 165 BPM
MAX. SYSTOLIC BP: 160 MMHG
PROTOCOL NAME: NORMAL
TARGET HR FORMULA: NORMAL
TEST INDICATION: NORMAL
TIME IN EXERCISE PHASE: NORMAL

## 2018-12-01 ENCOUNTER — OFFICE VISIT (OUTPATIENT)
Dept: URGENT CARE | Facility: CLINIC | Age: 58
End: 2018-12-01
Payer: COMMERCIAL

## 2018-12-01 VITALS
WEIGHT: 178 LBS | SYSTOLIC BLOOD PRESSURE: 140 MMHG | RESPIRATION RATE: 16 BRPM | HEIGHT: 70 IN | HEART RATE: 82 BPM | OXYGEN SATURATION: 100 % | BODY MASS INDEX: 25.48 KG/M2 | TEMPERATURE: 98 F | DIASTOLIC BLOOD PRESSURE: 80 MMHG

## 2018-12-01 DIAGNOSIS — H01.00B BLEPHARITIS OF UPPER AND LOWER EYELIDS OF BOTH EYES, UNSPECIFIED TYPE: ICD-10-CM

## 2018-12-01 DIAGNOSIS — H01.00A BLEPHARITIS OF UPPER AND LOWER EYELIDS OF BOTH EYES, UNSPECIFIED TYPE: ICD-10-CM

## 2018-12-01 DIAGNOSIS — H02.59 SUPERFICIAL SWELLING OF EYELID: Primary | ICD-10-CM

## 2018-12-01 PROCEDURE — 99213 OFFICE O/P EST LOW 20 MIN: CPT | Performed by: PHYSICIAN ASSISTANT

## 2018-12-01 RX ORDER — METOPROLOL SUCCINATE 50 MG/1
TABLET, EXTENDED RELEASE ORAL
Refills: 5 | COMMUNITY
Start: 2018-11-29 | End: 2019-01-17

## 2018-12-01 RX ORDER — PREDNISONE 20 MG/1
TABLET ORAL
Qty: 15 TABLET | Refills: 0 | Status: SHIPPED | OUTPATIENT
Start: 2018-12-01 | End: 2018-12-05

## 2018-12-01 NOTE — PROGRESS NOTES
[facility]        NAME: Dariela Prince is a 66 y o  male  : 1960    MRN: [id number]  DATE: December 3, 2018  TIME: 11:01 AM    Assessment and Plan   Superficial swelling of eyelid [H02 59]  1  Superficial swelling of eyelid  predniSONE 20 mg tablet   2  Blepharitis of upper and lower eyelids of both eyes, unspecified type           Patient Instructions     Discussed pt's condition with him  He appears to have blepharitis of the eyelids with some periorbital swelling  Could be due to an allergic or chemical reaction  I will treat him with a 5 day burst dose of oral Prednisone 60 mg daily and rec warm compresses to the area as well as an oral antihistamine  He does not appear to have conjunctivitis  Follow up with PCP in 3-5 days  Proceed to  ER if symptoms worsen  Chief Complaint     Chief Complaint   Patient presents with    Rash     perry orbital erythema of unknown origin is itching, burning         History of Present Illness       Pt presents with a few day history of B/L itching, redness, and swelling around his eyes  He denies itching in the eye or any crusting, drainage, photophobia, and denies FB/trauma  He is not sure if he could have been exposed to something in his environment that is making him react but he does work around a lot of chemicals  Denies any new personal skin care products, meds, foods  Has not been taking anything regularly for the symptoms  Review of Systems   Review of Systems   Constitutional: Negative  Eyes: Positive for itching (Periorbital B/L)  Negative for photophobia, pain, discharge, redness and visual disturbance  Respiratory: Negative  Cardiovascular: Negative  Gastrointestinal: Negative  Genitourinary: Negative      Skin:        B/L periorbital redness and swelling          Current Medications       Current Outpatient Prescriptions:     aspirin (ECOTRIN LOW STRENGTH) 81 mg EC tablet, Take 81 mg by mouth daily, Disp: , Rfl:    clopidogrel (PLAVIX) 75 mg tablet, Take 75 mg by mouth daily, Disp: , Rfl:     lisinopril (ZESTRIL) 2 5 mg tablet, Take 2 5 mg by mouth daily, Disp: , Rfl:     metoprolol succinate (TOPROL-XL) 50 mg 24 hr tablet, , Disp: , Rfl: 5    metoprolol tartrate (LOPRESSOR) 50 mg tablet, Take 50 mg by mouth daily  , Disp: , Rfl:     pravastatin (PRAVACHOL) 40 mg tablet, Take 2 tablets (80 mg total) by mouth daily, Disp: 60 tablet, Rfl: 0    ranitidine (ZANTAC) 150 MG capsule, Take 1 capsule (150 mg total) by mouth 2 (two) times a day, Disp: 180 capsule, Rfl: 1    predniSONE 20 mg tablet, Take 3 tablets once daily with food for 5 days  , Disp: 15 tablet, Rfl: 0    Current Allergies     Allergies as of 12/01/2018    (No Known Allergies)            The following portions of the patient's history were reviewed and updated as appropriate: allergies, current medications, past family history, past medical history, past social history, past surgical history and problem list      Past Medical History:   Diagnosis Date    Complete tear of rotator cuff 04/01/2008    Coronary artery disease     Hx of heart artery stent     Hypertension     [address] [facility] [address] [address] [address] )        Past Surgical History:   Procedure Laterality Date    CARDIAC SURGERY         Family History   Problem Relation Age of Onset    Leukemia Mother     Cancer Mother     Cancer Father          Medications have been verified  Objective   /80   Pulse 82   Temp 98 °F (36 7 °C)   Resp 16   Ht 5' 10" (1 778 m)   Wt 80 7 kg (178 lb)   SpO2 100%   BMI 25 54 kg/m²        Physical Exam     Physical Exam   Constitutional: He is oriented to person, place, and time  He appears well-developed and well-nourished  No distress  Eyes:   B/L mild periorbital STS into the upper cheeks as well as erythema of skin but conjunctivae are WNL and there is no visible crusting, drainage, photophobia noted  No FB noted on flipping of lids      Neurological: He is alert and oriented to person, place, and time  Psychiatric: He has a normal mood and affect  Vitals reviewed

## 2018-12-13 ENCOUNTER — HOSPITAL ENCOUNTER (EMERGENCY)
Facility: HOSPITAL | Age: 58
Discharge: HOME | End: 2018-12-13
Attending: EMERGENCY MEDICINE
Payer: COMMERCIAL

## 2018-12-13 VITALS
HEART RATE: 78 BPM | RESPIRATION RATE: 20 BRPM | DIASTOLIC BLOOD PRESSURE: 101 MMHG | SYSTOLIC BLOOD PRESSURE: 175 MMHG | TEMPERATURE: 98 F | OXYGEN SATURATION: 100 %

## 2018-12-13 DIAGNOSIS — L53.8 PERIORBITAL ERYTHEMA: ICD-10-CM

## 2018-12-13 DIAGNOSIS — T78.40XA ALLERGIC REACTION: Primary | ICD-10-CM

## 2018-12-13 DIAGNOSIS — L30.9 DERMATITIS: ICD-10-CM

## 2018-12-13 PROCEDURE — 99283 EMERGENCY DEPT VISIT LOW MDM: CPT

## 2018-12-13 RX ORDER — METHYLPREDNISOLONE 4 MG/1
TABLET ORAL
Qty: 21 TABLET | Refills: 0 | Status: SHIPPED | OUTPATIENT
Start: 2018-12-13 | End: 2019-01-17

## 2018-12-13 RX ORDER — PREDNISONE 20 MG/1
60 TABLET ORAL ONCE
Status: COMPLETED | OUTPATIENT
Start: 2018-12-13 | End: 2018-12-13

## 2018-12-13 RX ADMIN — PREDNISONE 60 MG: 20 TABLET ORAL at 02:29

## 2018-12-14 ENCOUNTER — TELEPHONE (OUTPATIENT)
Dept: ADMINISTRATIVE | Facility: OTHER | Age: 58
End: 2018-12-14

## 2018-12-14 NOTE — TELEPHONE ENCOUNTER
LMOM for patient to call [facility]  ED Provider instructions were to schedule a follow up appointment in 3 days for this ED visit  ED visit documented in ED log

## 2019-01-16 NOTE — ED PROVIDER NOTES
History  Chief Complaint   Patient presents with    Allergic Reaction     pt states was seen over a week ago at [facility] for redness, itching and burning around eyes, finished predisone over the weekend, started again with same symptoms yesterday, took benadryl at 6:30pm without relief  Not sure what he is reacting to, no difficulty breathing no other rash to body     61-year-old male presents for evaluation of allergic reaction  Patient was seen at [facility] over a week ago and diagnosed with an allergic reaction treated with steroids and Benadryl  Steroids worse finished yesterday and now patient is starting to get itchy rash back  Patient was redness periorbitally  No discharge, no tongue swelling, no lip swelling, no difficulty swelling, no difficulty breathing, no wheezing  History provided by:  Patient  Allergic Reaction   Presenting symptoms: itching and rash    Presenting symptoms: no difficulty breathing, no difficulty swallowing, no swelling and no wheezing    Severity:  Mild  Duration:  1 week  Prior allergic episodes:  Unable to specify  Context: not food allergies and not new detergents/soaps    Relieved by: Antihistamines and steroids  Worsened by:  Nothing  Ineffective treatments:  Rest      Prior to Admission Medications   Prescriptions Last Dose Informant Patient Reported?  Taking?   aspirin (ECOTRIN LOW STRENGTH) 81 mg EC tablet   Yes No   Sig: Take 81 mg by mouth daily   clopidogrel (PLAVIX) 75 mg tablet   Yes No   Sig: Take 75 mg by mouth daily   lisinopril (ZESTRIL) 2 5 mg tablet   Yes No   Sig: Take 2 5 mg by mouth daily   metoprolol succinate (TOPROL-XL) 50 mg 24 hr tablet   Yes No   metoprolol tartrate (LOPRESSOR) 50 mg tablet  Self Yes No   Sig: Take 50 mg by mouth daily     pravastatin (PRAVACHOL) 40 mg tablet   No No   Sig: Take 2 tablets (80 mg total) by mouth daily   ranitidine (ZANTAC) 150 MG capsule   No No   Sig: Take 1 capsule (150 mg total) by mouth 2 (two) times a day Facility-Administered Medications: None       Past Medical History:   Diagnosis Date    Complete tear of rotator cuff 04/01/2008    Coronary artery disease     Hx of heart artery stent     Hypertension     [facility])        Past Surgical History:   Procedure Laterality Date    CARDIAC SURGERY         Family History   Problem Relation Age of Onset    Leukemia Mother     Cancer Mother     Cancer Father      I have reviewed and agree with the history as documented  Social History   Substance Use Topics    Smoking status: Former Smoker     Packs/day: 1 50     Quit date: 10/11/2017    Smokeless tobacco: Never Used      Comment: per allscripts current every day smoker    Alcohol use No      Comment: occasional        Review of Systems   Constitutional: Negative  Negative for chills and fever  HENT: Negative  Negative for facial swelling and trouble swallowing  Respiratory: Negative for cough, chest tightness, shortness of breath, wheezing and stridor  Cardiovascular: Negative  Gastrointestinal: Negative  Genitourinary: Negative  Musculoskeletal: Negative  Skin: Positive for itching and rash  Neurological: Negative  Hematological: Negative  Psychiatric/Behavioral: Negative  All other systems reviewed and are negative  Physical Exam  Physical Exam   Constitutional: He is oriented to person, place, and time  He appears well-developed and well-nourished  HENT:   Head: Normocephalic and atraumatic  Right Ear: External ear normal    Left Ear: External ear normal    Nose: Nose normal    Mouth/Throat: Oropharynx is clear and moist    Eyes: Pupils are equal, round, and reactive to light  Conjunctivae and EOM are normal    Neck: Normal range of motion  Neck supple  Cardiovascular: Normal rate, regular rhythm, normal heart sounds and intact distal pulses  Pulmonary/Chest: Effort normal and breath sounds normal    Abdominal: Soft   Bowel sounds are normal    Musculoskeletal: Normal range of motion  Neurological: He is alert and oriented to person, place, and time  Skin: Skin is warm and dry  Capillary refill takes less than 2 seconds  Rash noted  Psychiatric: He has a normal mood and affect  His behavior is normal  Judgment and thought content normal    Nursing note and vitals reviewed  Vital Signs  ED Triage Vitals [12/13/18 0127]   Temperature Pulse Respirations Blood Pressure SpO2   98 °F (36 7 °C) 78 20 (!) 175/101 100 %      Temp Source Heart Rate Source Patient Position - Orthostatic VS BP Location FiO2 (%)   Tympanic -- Sitting Left arm --      Pain Score       6           Vitals:    12/13/18 0127   BP: (!) 175/101   Pulse: 78   Patient Position - Orthostatic VS: Sitting       Visual Acuity      ED Medications  Medications   predniSONE tablet 60 mg (60 mg Oral Given 12/13/18 0229)       Diagnostic Studies  Results Reviewed     None                 No orders to display              Procedures  Procedures       Phone Contacts  ED Phone Contact    ED Course                               MDM  CritCare Time    Disposition  Final diagnoses: Allergic reaction   Periorbital erythema - Bilaterally   Dermatitis     Time reflects when diagnosis was documented in both MDM as applicable and the Disposition within this note     Time User Action Codes Description Comment    12/13/2018  2:20 AM Lynnann Reveal Add [T78 40XA] Allergic reaction     12/13/2018  2:20 AM Lynnann Reveal Add [L53 8] Periorbital erythema     12/13/2018  2:20 AM Lynnann Reveal Modify [L53 8] Periorbital erythema Bilaterally    12/13/2018  2:20 AM Lynnann Reveal Add [L30 9] Dermatitis       ED Disposition     ED Disposition Condition Comment    Discharge  Jessi Arora 63 discharge to home/self care      Condition at discharge: Stable        Follow-up Information     Follow up With Specialties Details Why Αμαλίας MD [facility] Schedule an appointment as soon as possible for a visit in 3 days  [address]  [address] [place] [address]  [phone]            Discharge Medication List as of 12/13/2018  2:22 AM      START taking these medications    Details   Methylprednisolone 4 MG TBPK Use as directed on package, Normal         CONTINUE these medications which have NOT CHANGED    Details   aspirin (ECOTRIN LOW STRENGTH) 81 mg EC tablet Take 81 mg by mouth daily, Historical Med      clopidogrel (PLAVIX) 75 mg tablet Take 75 mg by mouth daily, Historical Med      lisinopril (ZESTRIL) 2 5 mg tablet Take 2 5 mg by mouth daily, Historical Med      metoprolol succinate (TOPROL-XL) 50 mg 24 hr tablet Starting Thu 11/29/2018, Historical Med      metoprolol tartrate (LOPRESSOR) 50 mg tablet Take 50 mg by mouth daily  , Historical Med      pravastatin (PRAVACHOL) 40 mg tablet Take 2 tablets (80 mg total) by mouth daily, Starting Fri 5/11/2018, No Print      ranitidine (ZANTAC) 150 MG capsule Take 1 capsule (150 mg total) by mouth 2 (two) times a day, Starting Fri 2/9/2018, Normal           No discharge procedures on file      ED Provider  Electronically Signed by           Cha Fontaine MD  01/16/19 2022

## 2019-01-17 ENCOUNTER — OFFICE VISIT (OUTPATIENT)
Dept: FAMILY MEDICINE CLINIC | Facility: CLINIC | Age: 59
End: 2019-01-17
Payer: COMMERCIAL

## 2019-01-17 VITALS
BODY MASS INDEX: 25.2 KG/M2 | DIASTOLIC BLOOD PRESSURE: 80 MMHG | HEIGHT: 70 IN | HEART RATE: 80 BPM | RESPIRATION RATE: 18 BRPM | TEMPERATURE: 97.8 F | WEIGHT: 176 LBS | SYSTOLIC BLOOD PRESSURE: 150 MMHG

## 2019-01-17 DIAGNOSIS — E78.5 HYPERLIPIDEMIA LDL GOAL <100: ICD-10-CM

## 2019-01-17 DIAGNOSIS — I25.110 CORONARY ARTERY DISEASE INVOLVING NATIVE CORONARY ARTERY OF NATIVE HEART WITH UNSTABLE ANGINA PECTORIS (HCC): ICD-10-CM

## 2019-01-17 DIAGNOSIS — L21.9 SEBORRHEIC DERMATITIS: Primary | ICD-10-CM

## 2019-01-17 PROBLEM — R07.89 ATYPICAL CHEST PAIN: Status: RESOLVED | Noted: 2018-05-11 | Resolved: 2019-01-17

## 2019-01-17 PROCEDURE — 99214 OFFICE O/P EST MOD 30 MIN: CPT | Performed by: FAMILY MEDICINE

## 2019-01-17 PROCEDURE — 3008F BODY MASS INDEX DOCD: CPT | Performed by: FAMILY MEDICINE

## 2019-01-17 RX ORDER — TRIAMCINOLONE ACETONIDE 1 MG/G
CREAM TOPICAL 2 TIMES DAILY
Qty: 80 G | Refills: 0 | Status: SHIPPED | OUTPATIENT
Start: 2019-01-17 | End: 2019-03-10

## 2019-01-17 RX ORDER — METHYLPREDNISOLONE 4 MG/1
TABLET ORAL
Qty: 21 TABLET | Refills: 0 | Status: SHIPPED | OUTPATIENT
Start: 2019-01-17 | End: 2019-01-23

## 2019-01-17 RX ORDER — FLUCONAZOLE 100 MG/1
100 TABLET ORAL DAILY
Qty: 7 TABLET | Refills: 0 | Status: SHIPPED | OUTPATIENT
Start: 2019-01-17 | End: 2019-01-24

## 2019-01-17 NOTE — PROGRESS NOTES
Assessment/Plan:    No problem-specific Assessment & Plan notes found for this encounter  Recurrent rash, ophtho consult reviewed  Steroid risks advised  Suspect underlying seborrheic dermatitis  Keep allergist appt in 2w  F/u or ER if sx of sob or dysphagia  Tolerating statin, risk reduction aware for CAD  CAD stable, no orthopnea or cp     Diagnoses and all orders for this visit:    Seborrheic dermatitis  -     methylPREDNISolone 4 MG tablet therapy pack; Use as directed on package  -     fluconazole (DIFLUCAN) 100 mg tablet; Take 1 tablet (100 mg total) by mouth daily for 7 days  -     triamcinolone (KENALOG) 0 1 % cream; Apply topically 2 (two) times a day Short term, face    Coronary artery disease involving native coronary artery of native heart with unstable angina pectoris ([facility] [address] )    Hyperlipidemia LDL goal <100          No Follow-up on file  Subjective:      Patient ID: Laverne Garcia is a 66 y o  male  Chief Complaint   Patient presents with    Facial Swelling     allergic reaction around eyes, forehead, swollen and red akrma     Rash     same thing on eyes is on wrists akrma        HPI  About 1m  Around b/l eyes  Gradual onset  Itchy  Red  Eyelids b/l more swollen before  [facility]  Vapor exposure? Eye doctor last week, eyes are fine, Dr Angy Hou  No new meds or food or supplements  Better after prednisone but started again at end  No sob or wheeze or dysphagia  Went to ER  Given medrol and improved and recurred at end  Been on benadryl  On ranitidine for while  Triggers unknown  Steroid pills x 3 courses already  Spreading to forehead and down neck and now some areas on wrist      The following portions of the patient's history were reviewed and updated as appropriate: allergies, current medications, past family history, past medical history, past social history, past surgical history and problem list     Review of Systems   HENT: Negative for trouble swallowing      Respiratory: Negative for shortness of breath and wheezing  Current Outpatient Prescriptions   Medication Sig Dispense Refill    aspirin (ECOTRIN LOW STRENGTH) 81 mg EC tablet Take 81 mg by mouth daily      clopidogrel (PLAVIX) 75 mg tablet Take 75 mg by mouth daily      lisinopril (ZESTRIL) 2 5 mg tablet Take 2 5 mg by mouth daily      pravastatin (PRAVACHOL) 40 mg tablet Take 2 tablets (80 mg total) by mouth daily 60 tablet 0    ranitidine (ZANTAC) 150 MG capsule Take 1 capsule (150 mg total) by mouth 2 (two) times a day 180 capsule 1    fluconazole (DIFLUCAN) 100 mg tablet Take 1 tablet (100 mg total) by mouth daily for 7 days 7 tablet 0    methylPREDNISolone 4 MG tablet therapy pack Use as directed on package 21 tablet 0    metoprolol tartrate (LOPRESSOR) 50 mg tablet Take 50 mg by mouth daily        triamcinolone (KENALOG) 0 1 % cream Apply topically 2 (two) times a day Short term, face 80 g 0     No current facility-administered medications for this visit  Objective:    /80   Pulse 80   Temp 97 8 °F (36 6 °C)   Resp 18   Ht 5' 10" (1 778 m)   Wt 79 8 kg (176 lb)   BMI 25 25 kg/m²        Physical Exam   Constitutional: He appears well-developed  No distress  HENT:   Head: Normocephalic  Mouth/Throat: No oropharyngeal exudate  Eyes: Conjunctivae are normal  No scleral icterus  Neck: Neck supple  No tracheal deviation present  Cardiovascular: Normal rate and intact distal pulses  No murmur heard  Pulmonary/Chest: Effort normal  No respiratory distress  He has no wheezes  He has no rales  Abdominal: Soft  He exhibits no distension  Musculoskeletal: He exhibits no edema or deformity  Lymphadenopathy:     He has no cervical adenopathy  Neurological: He is alert  Skin: Skin is warm and dry  No rash noted  No pallor     Periorbital dermatitis with scaling extending up forehead but also down b/l neck to clavicle areas   Psychiatric: His behavior is normal  Thought content normal    Nursing note and vitals reviewed               Nannette Sharpe DO

## 2019-03-10 ENCOUNTER — APPOINTMENT (EMERGENCY)
Dept: RADIOLOGY | Facility: HOSPITAL | Age: 59
End: 2019-03-10
Payer: COMMERCIAL

## 2019-03-10 ENCOUNTER — HOSPITAL ENCOUNTER (OUTPATIENT)
Facility: HOSPITAL | Age: 59
Setting detail: OBSERVATION
Discharge: HOME | End: 2019-03-11
Attending: EMERGENCY MEDICINE | Admitting: INTERNAL MEDICINE
Payer: COMMERCIAL

## 2019-03-10 DIAGNOSIS — R07.9 CHEST PAIN: Primary | ICD-10-CM

## 2019-03-10 DIAGNOSIS — E78.5 HYPERLIPIDEMIA LDL GOAL <100: ICD-10-CM

## 2019-03-10 LAB
ALBUMIN SERPL BCP-MCNC: 3.8 G/DL (ref 3.5–5)
ALP SERPL-CCNC: 69 U/L (ref 46–116)
ALT SERPL W P-5'-P-CCNC: 22 U/L (ref 12–78)
ANION GAP SERPL CALCULATED.3IONS-SCNC: 10 MMOL/L (ref 4–13)
APTT PPP: 28 SECONDS (ref 26–38)
AST SERPL W P-5'-P-CCNC: 15 U/L (ref 5–45)
BASOPHILS # BLD AUTO: 0.09 THOUSANDS/ΜL (ref 0–0.1)
BASOPHILS NFR BLD AUTO: 1 % (ref 0–1)
BILIRUB SERPL-MCNC: 0.3 MG/DL (ref 0.2–1)
BUN SERPL-MCNC: 14 MG/DL (ref 5–25)
CALCIUM SERPL-MCNC: 9.3 MG/DL (ref 8.3–10.1)
CHLORIDE SERPL-SCNC: 104 MMOL/L (ref 100–108)
CO2 SERPL-SCNC: 23 MMOL/L (ref 21–32)
CREAT SERPL-MCNC: 1.03 MG/DL (ref 0.6–1.3)
EOSINOPHIL # BLD AUTO: 0.27 THOUSAND/ΜL (ref 0–0.61)
EOSINOPHIL NFR BLD AUTO: 3 % (ref 0–6)
ERYTHROCYTE [DISTWIDTH] IN BLOOD BY AUTOMATED COUNT: 13.5 % (ref 11.6–15.1)
GFR SERPL CREATININE-BSD FRML MDRD: 81 ML/MIN/1.73SQ M
GLUCOSE SERPL-MCNC: 85 MG/DL (ref 65–140)
HCT VFR BLD AUTO: 48.6 % (ref 36.5–49.3)
HGB BLD-MCNC: 14.9 G/DL (ref 12–17)
IMM GRANULOCYTES # BLD AUTO: 0.02 THOUSAND/UL (ref 0–0.2)
IMM GRANULOCYTES NFR BLD AUTO: 0 % (ref 0–2)
INR PPP: 0.9 (ref 0.86–1.16)
LYMPHOCYTES # BLD AUTO: 4.64 THOUSANDS/ΜL (ref 0.6–4.47)
LYMPHOCYTES NFR BLD AUTO: 43 % (ref 14–44)
MCH RBC QN AUTO: 30.5 PG (ref 26.8–34.3)
MCHC RBC AUTO-ENTMCNC: 30.7 G/DL (ref 31.4–37.4)
MCV RBC AUTO: 99 FL (ref 82–98)
MONOCYTES # BLD AUTO: 1.22 THOUSAND/ΜL (ref 0.17–1.22)
MONOCYTES NFR BLD AUTO: 12 % (ref 4–12)
NEUTROPHILS # BLD AUTO: 4.31 THOUSANDS/ΜL (ref 1.85–7.62)
NEUTS SEG NFR BLD AUTO: 41 % (ref 43–75)
NRBC BLD AUTO-RTO: 0 /100 WBCS
NT-PROBNP SERPL-MCNC: 114 PG/ML
PLATELET # BLD AUTO: 273 THOUSANDS/UL (ref 149–390)
PMV BLD AUTO: 9.4 FL (ref 8.9–12.7)
POTASSIUM SERPL-SCNC: 3.9 MMOL/L (ref 3.5–5.3)
PROT SERPL-MCNC: 7 G/DL (ref 6.4–8.2)
PROTHROMBIN TIME: 9.5 SECONDS (ref 9.4–11.7)
RBC # BLD AUTO: 4.89 MILLION/UL (ref 3.88–5.62)
SODIUM SERPL-SCNC: 137 MMOL/L (ref 136–145)
TROPONIN I SERPL-MCNC: <0.02 NG/ML
WBC # BLD AUTO: 10.55 THOUSAND/UL (ref 4.31–10.16)

## 2019-03-10 PROCEDURE — 71045 X-RAY EXAM CHEST 1 VIEW: CPT

## 2019-03-10 PROCEDURE — 80053 COMPREHEN METABOLIC PANEL: CPT | Performed by: EMERGENCY MEDICINE

## 2019-03-10 PROCEDURE — 74174 CTA ABD&PLVS W/CONTRAST: CPT

## 2019-03-10 PROCEDURE — 36415 COLL VENOUS BLD VENIPUNCTURE: CPT

## 2019-03-10 PROCEDURE — 83880 ASSAY OF NATRIURETIC PEPTIDE: CPT | Performed by: EMERGENCY MEDICINE

## 2019-03-10 PROCEDURE — 99285 EMERGENCY DEPT VISIT HI MDM: CPT

## 2019-03-10 PROCEDURE — 93005 ELECTROCARDIOGRAM TRACING: CPT

## 2019-03-10 PROCEDURE — 85730 THROMBOPLASTIN TIME PARTIAL: CPT | Performed by: EMERGENCY MEDICINE

## 2019-03-10 PROCEDURE — 85610 PROTHROMBIN TIME: CPT | Performed by: EMERGENCY MEDICINE

## 2019-03-10 PROCEDURE — 96374 THER/PROPH/DIAG INJ IV PUSH: CPT

## 2019-03-10 PROCEDURE — 71275 CT ANGIOGRAPHY CHEST: CPT

## 2019-03-10 PROCEDURE — 85025 COMPLETE CBC W/AUTO DIFF WBC: CPT | Performed by: EMERGENCY MEDICINE

## 2019-03-10 PROCEDURE — 84484 ASSAY OF TROPONIN QUANT: CPT | Performed by: EMERGENCY MEDICINE

## 2019-03-10 RX ADMIN — MORPHINE SULFATE 2 MG: 2 INJECTION, SOLUTION INTRAMUSCULAR; INTRAVENOUS at 22:55

## 2019-03-10 RX ADMIN — IOHEXOL 100 ML: 350 INJECTION, SOLUTION INTRAVENOUS at 23:50

## 2019-03-11 ENCOUNTER — APPOINTMENT (OUTPATIENT)
Dept: NON INVASIVE DIAGNOSTICS | Facility: HOSPITAL | Age: 59
End: 2019-03-11
Payer: COMMERCIAL

## 2019-03-11 ENCOUNTER — APPOINTMENT (OUTPATIENT)
Dept: RADIOLOGY | Facility: HOSPITAL | Age: 59
End: 2019-03-11
Payer: COMMERCIAL

## 2019-03-11 VITALS
HEIGHT: 70 IN | WEIGHT: 176.81 LBS | RESPIRATION RATE: 16 BRPM | TEMPERATURE: 97.9 F | SYSTOLIC BLOOD PRESSURE: 114 MMHG | DIASTOLIC BLOOD PRESSURE: 64 MMHG | HEART RATE: 54 BPM | OXYGEN SATURATION: 99 % | BODY MASS INDEX: 25.31 KG/M2

## 2019-03-11 PROBLEM — R07.9 CHEST PAIN: Status: ACTIVE | Noted: 2018-05-11

## 2019-03-11 LAB
ATRIAL RATE: 67 BPM
CHEST PAIN STATEMENT: NORMAL
CHOLEST SERPL-MCNC: 186 MG/DL (ref 50–200)
HDLC SERPL-MCNC: 27 MG/DL (ref 40–60)
LDLC SERPL CALC-MCNC: 130 MG/DL (ref 0–100)
MAX DIASTOLIC BP: 80 MMHG
MAX HEART RATE: 125 BPM
MAX PREDICTED HEART RATE: 164 BPM
MAX. SYSTOLIC BP: 156 MMHG
P AXIS: 53 DEGREES
PR INTERVAL: 122 MS
PROTOCOL NAME: NORMAL
QRS AXIS: 73 DEGREES
QRSD INTERVAL: 98 MS
QT INTERVAL: 396 MS
QTC INTERVAL: 418 MS
REASON FOR TERMINATION: NORMAL
T WAVE AXIS: 46 DEGREES
TARGET HR FORMULA: NORMAL
TEST INDICATION: NORMAL
TIME IN EXERCISE PHASE: NORMAL
TRIGL SERPL-MCNC: 144 MG/DL
TROPONIN I SERPL-MCNC: <0.02 NG/ML
TROPONIN I SERPL-MCNC: <0.02 NG/ML
VENTRICULAR RATE: 67 BPM

## 2019-03-11 PROCEDURE — A9502 TC99M TETROFOSMIN: HCPCS

## 2019-03-11 PROCEDURE — 78452 HT MUSCLE IMAGE SPECT MULT: CPT

## 2019-03-11 PROCEDURE — 80061 LIPID PANEL: CPT | Performed by: NURSE PRACTITIONER

## 2019-03-11 PROCEDURE — 99244 OFF/OP CNSLTJ NEW/EST MOD 40: CPT | Performed by: INTERNAL MEDICINE

## 2019-03-11 PROCEDURE — 99235 HOSP IP/OBS SAME DATE MOD 70: CPT | Performed by: INTERNAL MEDICINE

## 2019-03-11 PROCEDURE — 93017 CV STRESS TEST TRACING ONLY: CPT

## 2019-03-11 PROCEDURE — 78452 HT MUSCLE IMAGE SPECT MULT: CPT | Performed by: INTERNAL MEDICINE

## 2019-03-11 PROCEDURE — 90682 RIV4 VACC RECOMBINANT DNA IM: CPT | Performed by: INTERNAL MEDICINE

## 2019-03-11 PROCEDURE — 93018 CV STRESS TEST I&R ONLY: CPT | Performed by: INTERNAL MEDICINE

## 2019-03-11 PROCEDURE — 93010 ELECTROCARDIOGRAM REPORT: CPT | Performed by: INTERNAL MEDICINE

## 2019-03-11 PROCEDURE — 84484 ASSAY OF TROPONIN QUANT: CPT | Performed by: EMERGENCY MEDICINE

## 2019-03-11 PROCEDURE — 84484 ASSAY OF TROPONIN QUANT: CPT | Performed by: NURSE PRACTITIONER

## 2019-03-11 PROCEDURE — 93016 CV STRESS TEST SUPVJ ONLY: CPT | Performed by: INTERNAL MEDICINE

## 2019-03-11 PROCEDURE — 87081 CULTURE SCREEN ONLY: CPT | Performed by: INTERNAL MEDICINE

## 2019-03-11 PROCEDURE — 36415 COLL VENOUS BLD VENIPUNCTURE: CPT | Performed by: EMERGENCY MEDICINE

## 2019-03-11 RX ORDER — METOPROLOL TARTRATE 50 MG/1
50 TABLET, FILM COATED ORAL DAILY
Status: DISCONTINUED | OUTPATIENT
Start: 2019-03-11 | End: 2019-03-11 | Stop reason: HOSPADM

## 2019-03-11 RX ORDER — ASPIRIN 81 MG/1
81 TABLET ORAL DAILY
Status: DISCONTINUED | OUTPATIENT
Start: 2019-03-11 | End: 2019-03-11 | Stop reason: HOSPADM

## 2019-03-11 RX ORDER — FAMOTIDINE 20 MG/1
20 TABLET, FILM COATED ORAL ONCE
Status: COMPLETED | OUTPATIENT
Start: 2019-03-11 | End: 2019-03-11

## 2019-03-11 RX ORDER — ATORVASTATIN CALCIUM 40 MG/1
40 TABLET, FILM COATED ORAL
Status: DISCONTINUED | OUTPATIENT
Start: 2019-03-11 | End: 2019-03-11

## 2019-03-11 RX ORDER — LISINOPRIL 2.5 MG/1
2.5 TABLET ORAL DAILY
Status: DISCONTINUED | OUTPATIENT
Start: 2019-03-11 | End: 2019-03-11 | Stop reason: HOSPADM

## 2019-03-11 RX ORDER — CLOPIDOGREL BISULFATE 75 MG/1
75 TABLET ORAL DAILY
Status: DISCONTINUED | OUTPATIENT
Start: 2019-03-11 | End: 2019-03-11 | Stop reason: HOSPADM

## 2019-03-11 RX ORDER — ATORVASTATIN CALCIUM 80 MG/1
80 TABLET, FILM COATED ORAL
Qty: 30 TABLET | Refills: 0 | Status: SHIPPED | OUTPATIENT
Start: 2019-03-11 | End: 2021-12-17

## 2019-03-11 RX ORDER — NITROGLYCERIN 0.4 MG/1
0.4 TABLET SUBLINGUAL
Qty: 30 TABLET | Refills: 0 | Status: SHIPPED | OUTPATIENT
Start: 2019-03-11 | End: 2021-12-17

## 2019-03-11 RX ORDER — NITROGLYCERIN 0.4 MG/1
0.4 TABLET SUBLINGUAL
Qty: 30 TABLET | Refills: 0 | Status: SHIPPED | OUTPATIENT
Start: 2019-03-11 | End: 2019-03-11

## 2019-03-11 RX ORDER — ISOSORBIDE MONONITRATE 30 MG/1
30 TABLET, EXTENDED RELEASE ORAL DAILY
Qty: 30 TABLET | Refills: 0 | Status: SHIPPED | OUTPATIENT
Start: 2019-03-11 | End: 2019-03-11

## 2019-03-11 RX ORDER — ATORVASTATIN CALCIUM 80 MG/1
80 TABLET, FILM COATED ORAL
Qty: 30 TABLET | Refills: 0 | Status: SHIPPED | OUTPATIENT
Start: 2019-03-11 | End: 2019-03-11

## 2019-03-11 RX ORDER — PRAVASTATIN SODIUM 80 MG/1
80 TABLET ORAL
Status: DISCONTINUED | OUTPATIENT
Start: 2019-03-11 | End: 2019-03-11

## 2019-03-11 RX ORDER — ASPIRIN 81 MG/1
324 TABLET, CHEWABLE ORAL ONCE
Status: COMPLETED | OUTPATIENT
Start: 2019-03-11 | End: 2019-03-11

## 2019-03-11 RX ORDER — NITROGLYCERIN 0.4 MG/1
0.4 TABLET SUBLINGUAL
Status: DISCONTINUED | OUTPATIENT
Start: 2019-03-11 | End: 2019-03-11 | Stop reason: HOSPADM

## 2019-03-11 RX ORDER — ISOSORBIDE MONONITRATE 30 MG/1
30 TABLET, EXTENDED RELEASE ORAL DAILY
Qty: 30 TABLET | Refills: 0 | Status: SHIPPED | OUTPATIENT
Start: 2019-03-11 | End: 2021-12-17

## 2019-03-11 RX ORDER — FAMOTIDINE 20 MG/1
20 TABLET, FILM COATED ORAL 2 TIMES DAILY
Status: DISCONTINUED | OUTPATIENT
Start: 2019-03-11 | End: 2019-03-11 | Stop reason: HOSPADM

## 2019-03-11 RX ORDER — ISOSORBIDE MONONITRATE 30 MG/1
30 TABLET, EXTENDED RELEASE ORAL DAILY
Status: DISCONTINUED | OUTPATIENT
Start: 2019-03-11 | End: 2019-03-11 | Stop reason: HOSPADM

## 2019-03-11 RX ORDER — ATORVASTATIN CALCIUM 80 MG/1
80 TABLET, FILM COATED ORAL
Status: DISCONTINUED | OUTPATIENT
Start: 2019-03-11 | End: 2019-03-11 | Stop reason: HOSPADM

## 2019-03-11 RX ADMIN — ATORVASTATIN CALCIUM 80 MG: 80 TABLET, FILM COATED ORAL at 16:43

## 2019-03-11 RX ADMIN — FAMOTIDINE 20 MG: 20 TABLET ORAL at 00:51

## 2019-03-11 RX ADMIN — INFLUENZA A VIRUS A/MICHIGAN/45/2015 (H1N1) RECOMBINANT HEMAGGLUTININ ANTIGEN, INFLUENZA A VIRUS A/SINGAPORE/INFIMH-16-0019/2016 (H3N2) RECOMBINANT HEMAGGLUTININ ANTIGEN, INFLUENZA B VIRUS B/MARYLAND/15/2016 RECOMBINANT HEMAGGLUTININ ANTIGEN, AND INFLUENZA B VIRUS B/PHUKET/3073/2013 RECOMBINANT HEMAGGLUTININ ANTIGEN 0.5 ML: 45; 45; 45; 45 INJECTION INTRAMUSCULAR at 16:43

## 2019-03-11 RX ADMIN — FAMOTIDINE 20 MG: 20 TABLET ORAL at 08:29

## 2019-03-11 RX ADMIN — ISOSORBIDE MONONITRATE 30 MG: 30 TABLET, EXTENDED RELEASE ORAL at 16:42

## 2019-03-11 RX ADMIN — METOPROLOL TARTRATE 50 MG: 50 TABLET, FILM COATED ORAL at 08:29

## 2019-03-11 RX ADMIN — ASPIRIN 81 MG 324 MG: 81 TABLET ORAL at 00:52

## 2019-03-11 RX ADMIN — LISINOPRIL 2.5 MG: 2.5 TABLET ORAL at 08:28

## 2019-03-11 RX ADMIN — REGADENOSON 0.4 MG: 0.08 INJECTION, SOLUTION INTRAVENOUS at 12:41

## 2019-03-11 RX ADMIN — ASPIRIN 81 MG: 81 TABLET, COATED ORAL at 08:29

## 2019-03-11 RX ADMIN — CLOPIDOGREL BISULFATE 75 MG: 75 TABLET ORAL at 08:28

## 2019-03-11 NOTE — DISCHARGE SUMMARY
Discharge- Jose R Peña 1960, 66 y o  male MRN: [id number]    Unit/Bed#: [address] [place] [address] Encounter: [id number]    Primary Care Provider: Candida Veronica MD   Date and time admitted to hospital: 3/10/2019 10:05 PM        * Chest pain  Assessment & Plan  · Atypical presentation for ACS  · Patient has known history of multivessel CAD and some small vessel disease  · Serial cardiac enzymes were negative  · Lipid panel showed elevated LDL  · Telemetry did not show any evidence of arrhythmias  · Patient underwent nuclear stress test which showed no evidence of reversible ischemia  · Cardiology consult appreciated  · Patient was added on antianginal medication with Imdur and nitroglycerin p r n  To follow up with primary cardiology      Coronary artery disease involving native coronary artery  Assessment & Plan  Continue dual anti-platelet therapy with aspirin and Plavix  Patient's Pravachol was changed to Lipitor 80 milligram p o  Daily due to elevated LDL    Essential hypertension  Assessment & Plan  Continue lisinopril metoprolol    Patient added on Imdur    Hyperlipidemia LDL goal <100  Assessment & Plan  Continue statin  Lipid panel showed elevated LDL  Pravachol was changed to Lipitor    Esophageal reflux  Assessment & Plan  Continue PPI        Discharging Physician / Practitioner: Federico Peguero MD  PCP: Candida Veronica MD  Admission Date: 3/10/2019  Discharge Date: 03/11/19    Reason for Admission: Chest Pain (Pt states he has 4/10 CP that began 1 hour ago )        Resolved Problems  Date Reviewed: 3/11/2019    None          Consultations During Hospital Stay:  [facility] Jones TO CARDIOLOGY    Procedures Performed:     · Nuclear stress test showed no evidence of reversible ischemia    Significant Findings / Test Results:     ·   Results from last 7 days   Lab Units 03/10/19  2219   WBC Thousand/uL 10 55*   HEMOGLOBIN g/dL 14 9   PLATELETS Thousands/uL 273     Results from last 7 days   Lab Units 03/10/19  2219   SODIUM mmol/L 137   POTASSIUM mmol/L 3 9   CHLORIDE mmol/L 104   CO2 mmol/L 23   BUN mg/dL 14   CREATININE mg/dL 1 03   CALCIUM mg/dL 9 3   TOTAL BILIRUBIN mg/dL 0 30   ALK PHOS U/L 69   ALT U/L 22   AST U/L 15     Results from last 7 days   Lab Units 03/10/19  2244   INR  0 90     Results from last 7 days   Lab Units 03/11/19  0615 03/11/19  0125 03/10/19  2219   TROPONIN I ng/mL <0 02 <0 02 <0 02     No results found for: HGBA1C          Blood Culture: No results found for: BLOODCX  Urine Culture: No results found for: URINECX  Sputum Culture: No components found for: SPUTUMCX  Wound Culture: No results found for: Luis Alberto Joshi     CTA dissection protocol chest abdomen pelvis w wo contrast   Final Result by Hernan Briggs MD (03/11 0019)      No evidence of aortic aneurysm or dissection  No evidence of acute pathology throughout the chest, abdomen or pelvis  Workstation performed: [id number]         X-ray chest 1 view portable   Final Result by Feliz Horton MD (03/11 5640)      No acute cardiopulmonary disease  Workstation performed: [id number]              Outpatient Tests Requested:  · Follow-up with Dr Dhiraj Carrero in 2 weeks    Complications:  None    Reason for Admission:   Chief Complaint   Patient presents with    Chest Pain     Pt states he has 4/10 CP that began 1 hour ago  Hospital Course:     Vern Sierra is a 66 y o  male patient with a PMH of CAD status post PCI, hypertension, hyperlipidemia who originally presented to the hospital on 3/10/2019 due to with sternal chest pain with radiation to left arm which is worse with deep inspiration  Patient has history of PCI to RCA in [facility]   Patient was admitted hospital to rule out ACS  Patient is here cardiac enzymes were negative  Later patient was seen by Cardiology and patient underwent nuclear stress test which showed no evidence of reversible ischemia    Patient's previous cardiac catheterization report reviewed which showed multivessel CAD and small vessel disease  Patient's medical treatment was optimized as per Cardiology recommendations and patient was added on Imdur and nitroglycerin p r n  Loyda Skipper Patient will follow up outpatient with primary cardiology  Please see above list of diagnoses and related plan for additional information  Condition at Discharge: stable       Discharge Day Visit / Exam:     Subjective:  Patient did complain of minimal retrosternal chest discomfort which is improved since yesterday  Denies any shortness of breath, headache or dizziness  Vitals: Blood Pressure: 114/64 (03/11/19 1100)  Pulse: (!) 54 (03/11/19 1100)  Temperature: 97 9 °F (36 6 °C) (03/11/19 1100)  Temp Source: Oral (03/11/19 1100)  Respirations: 16 (03/11/19 1100)  Height: 5' 10" (177 8 cm) (03/11/19 0258)  Weight - Scale: 80 2 kg (176 lb 12 9 oz) (03/11/19 0258)  SpO2: 99 % (03/11/19 1100)  Exam:   Physical Exam   Constitutional: No distress  HENT:   Head: Normocephalic and atraumatic  Eyes: Pupils are equal, round, and reactive to light  Conjunctivae are normal    Neck: Normal range of motion  Neck supple  Cardiovascular: Regular rhythm and normal heart sounds  Bradycardia   Pulmonary/Chest: Effort normal  No respiratory distress  He has no wheezes  He has no rhonchi  He has no rales  He exhibits no tenderness  Abdominal: Soft  Bowel sounds are normal  He exhibits no distension  There is no tenderness  There is no rebound and no guarding  Musculoskeletal: He exhibits no edema  Neurological: He is alert  No cranial nerve deficit  Skin: Skin is warm and dry  No rash noted  Discharge instructions/Information to patient and family:   See after visit summary for information provided to patient and family  Provisions for Follow-Up Care:  See after visit summary for information related to follow-up care and any pertinent home health orders        Disposition:     Home    Planned Readmission:  No     Discharge Statement:  I spent 25 minutes discharging the patient  This time was spent on the day of discharge  I had direct contact with the patient on the day of discharge  Greater than 50% of the total time was spent examining patient, answering all patient questions, arranging and discussing plan of care with patient as well as directly providing post-discharge instructions  Additional time then spent on discharge activities  Discharge Medications:  See after visit summary for reconciled discharge medications provided to patient and family        ** Please Note: This note has been constructed using a voice recognition system **

## 2019-03-11 NOTE — ED PROVIDER NOTES
History  Chief Complaint   Patient presents with    Chest Pain     Pt states he has 4/10 CP that began 1 hour ago  Pt in ER with c/o left sided chest pain that began tonight while at rest  He describes the pain as sharp and intermittent and it radiates to his back and down his left arm  Pt with a hx of MI and 2 coronary stents  Prior to Admission Medications   Prescriptions Last Dose Informant Patient Reported? Taking?   aspirin (ECOTRIN LOW STRENGTH) 81 mg EC tablet 3/10/2019 at Unknown time  Yes Yes   Sig: Take 81 mg by mouth daily   clopidogrel (PLAVIX) 75 mg tablet 3/10/2019 at Unknown time  Yes Yes   Sig: Take 75 mg by mouth daily   lisinopril (ZESTRIL) 2 5 mg tablet 3/10/2019 at Unknown time  Yes Yes   Sig: Take 2 5 mg by mouth daily   metoprolol tartrate (LOPRESSOR) 50 mg tablet 3/10/2019 at Unknown time Self Yes Yes   Sig: Take 50 mg by mouth daily     pravastatin (PRAVACHOL) 40 mg tablet 3/10/2019 at Unknown time  No Yes   Sig: Take 2 tablets (80 mg total) by mouth daily   ranitidine (ZANTAC) 150 MG capsule 3/9/2019 at Unknown time  No Yes   Sig: Take 1 capsule (150 mg total) by mouth 2 (two) times a day      Facility-Administered Medications: None       Past Medical History:   Diagnosis Date    Complete tear of rotator cuff 2008    Coronary artery disease     GERD (gastroesophageal reflux disease)     Hx of heart artery stent     Hypertension     MI (myocardial infarction) ([facility] [address] )        Past Surgical History:   Procedure Laterality Date    CARDIAC SURGERY         Family History   Problem Relation Age of Onset    Leukemia Mother     Cancer Mother     Cancer Father      I have reviewed and agree with the history as documented      Social History     Tobacco Use    Smoking status: Former Smoker     Packs/day: 1 50     Last attempt to quit: 10/11/2017     Years since quittin 4    Smokeless tobacco: Never Used    Tobacco comment: per juliánrigirish current every day smoker Substance Use Topics    Alcohol use: No     Comment: occasional    Drug use: No        Review of Systems   Constitutional: Negative for chills and fever  Respiratory: Negative for cough, shortness of breath and wheezing  Cardiovascular: Positive for chest pain  Negative for palpitations  Gastrointestinal: Negative for abdominal pain, constipation, diarrhea, nausea and vomiting  Genitourinary: Negative for dysuria, flank pain, hematuria and urgency  Musculoskeletal: Negative for back pain  Skin: Negative for color change and rash  All other systems reviewed and are negative  Physical Exam  Physical Exam   Constitutional: He is oriented to person, place, and time  He appears well-developed and well-nourished  HENT:   Head: Normocephalic and atraumatic  Eyes: Pupils are equal, round, and reactive to light  EOM are normal    Cardiovascular: Normal rate, regular rhythm and normal heart sounds  Pulmonary/Chest: Effort normal and breath sounds normal  No accessory muscle usage  No respiratory distress  He has no decreased breath sounds  Abdominal: Soft  Bowel sounds are normal  He exhibits no distension and no mass  There is no tenderness  There is no rebound and no guarding  Musculoskeletal:        Right lower leg: He exhibits no tenderness and no edema  Left lower leg: He exhibits no tenderness and no edema  Neurological: He is alert and oriented to person, place, and time  Skin: Skin is warm and dry  Psychiatric: He has a normal mood and affect  His behavior is normal  Judgment and thought content normal    Nursing note and vitals reviewed        Vital Signs  ED Triage Vitals [03/10/19 2206]   Temperature Pulse Respirations Blood Pressure SpO2   97 6 °F (36 4 °C) 74 18 (!) 178/109 100 %      Temp Source Heart Rate Source Patient Position - Orthostatic VS BP Location FiO2 (%)   Oral Monitor Lying Left arm --      Pain Score       4           Vitals:    03/10/19 2215 03/10/19 2245 03/10/19 2246 03/10/19 2300   BP: 170/93 168/100 156/100 132/83   Pulse: 62 64     Patient Position - Orthostatic VS:  Sitting         Visual Acuity      ED Medications  Medications   morphine injection 2 mg (2 mg Intravenous Given 3/10/19 2255)   iohexol (OMNIPAQUE) 350 MG/ML injection (SINGLE-DOSE) 100 mL (100 mL Intravenous Given 3/10/19 2350)   aspirin chewable tablet 324 mg (324 mg Oral Given 3/11/19 0052)   famotidine (PEPCID) tablet 20 mg (20 mg Oral Given 3/11/19 0051)       Diagnostic Studies  Results Reviewed     Procedure Component Value Units Date/Time    Protime-INR [[id number]]  (Normal) Collected:  03/10/19 2244    Lab Status:  Final result Specimen:  Blood from Arm, Left Updated:  03/10/19 2304     Protime 9 5 seconds      INR 0 90    APTT [[id number]]  (Normal) Collected:  03/10/19 2244    Lab Status:  Final result Specimen:  Blood from Arm, Left Updated:  03/10/19 2304     PTT 28 seconds     Comprehensive metabolic panel [[id number]] Collected:  03/10/19 2219    Lab Status:  Final result Specimen:  Blood from Hand, Left Updated:  03/10/19 2253     Sodium 137 mmol/L      Potassium 3 9 mmol/L      Chloride 104 mmol/L      CO2 23 mmol/L      ANION GAP 10 mmol/L      BUN 14 mg/dL      Creatinine 1 03 mg/dL      Glucose 85 mg/dL      Calcium 9 3 mg/dL      AST 15 U/L      ALT 22 U/L      Alkaline Phosphatase 69 U/L      Total Protein 7 0 g/dL      Albumin 3 8 g/dL      Total Bilirubin 0 30 mg/dL      eGFR 81 ml/min/1 73sq m     Narrative:       National Kidney Disease Education Program recommendations are as follows:  GFR calculation is accurate only with a steady state creatinine  Chronic Kidney disease less than 60 ml/min/1 73 sq  meters  Kidney failure less than 15 ml/min/1 73 sq  meters      B-type natriuretic peptide [[id number]]  (Normal) Collected:  03/10/19 2219    Lab Status:  Final result Specimen:  Blood from Hand, Left Updated:  03/10/19 2253     NT-proBNP 114 pg/mL     Troponin I [[id number]]  (Normal) Collected:  03/10/19 2219    Lab Status:  Final result Specimen:  Blood from Hand, Left Updated:  03/10/19 2246     Troponin I <0 02 ng/mL     CBC and differential [[id number]]  (Abnormal) Collected:  03/10/19 2219    Lab Status:  Final result Specimen:  Blood from Hand, Left Updated:  03/10/19 2226     WBC 10 55 Thousand/uL      RBC 4 89 Million/uL      Hemoglobin 14 9 g/dL      Hematocrit 48 6 %      MCV 99 fL      MCH 30 5 pg      MCHC 30 7 g/dL      RDW 13 5 %      MPV 9 4 fL      Platelets 818 Thousands/uL      nRBC 0 /100 WBCs      Neutrophils Relative 41 %      Immat GRANS % 0 %      Lymphocytes Relative 43 %      Monocytes Relative 12 %      Eosinophils Relative 3 %      Basophils Relative 1 %      Neutrophils Absolute 4 31 Thousands/µL      Immature Grans Absolute 0 02 Thousand/uL      Lymphocytes Absolute 4 64 Thousands/µL      Monocytes Absolute 1 22 Thousand/µL      Eosinophils Absolute 0 27 Thousand/µL      Basophils Absolute 0 09 Thousands/µL                  CTA dissection protocol chest abdomen pelvis w wo contrast   Final Result by Padmini Willard MD (03/11 0019)      No evidence of aortic aneurysm or dissection  No evidence of acute pathology throughout the chest, abdomen or pelvis                    Workstation performed: [id number]         X-ray chest 1 view portable    (Results Pending)              Procedures  ECG 12 Lead Documentation  Date/Time: 3/10/2019 12:14 AM  Performed by: Amy Mccurdy DO  Authorized by: Amy Mccurdy DO     Indications / Diagnosis:  Chest pain  ECG reviewed by me, the ED Provider: yes    Patient location:  ED  Previous ECG:     Previous ECG:  Unavailable  Interpretation:     Interpretation: normal    Rate:     ECG rate assessment: tachycardic    Rhythm:     Rhythm: sinus rhythm    Ectopy:     Ectopy: none    QRS:     QRS axis:  Normal  Conduction:     Conduction: normal    ST segments:     ST segments:  Normal  T waves:     T waves: normal             Phone Contacts  ED Phone Contact    ED Course                               MDM    Disposition  Final diagnoses:   Chest pain     Time reflects when diagnosis was documented in both MDM as applicable and the Disposition within this note     Time User Action Codes Description Comment    3/11/2019 12:40 AM Robin Moulton [R07 9] Chest pain       ED Disposition     ED Disposition Condition Date/Time Comment    Admit Stable Mon Mar 11, 2019 12:40 AM Case was discussed with Flori Somers NP and the patient's admission status was agreed to be Admission Status: observation status to the service of Dr Rosy Mckeon   Follow-up Information    None         Patient's Medications   Discharge Prescriptions    No medications on file     No discharge procedures on file      ED Provider  Electronically Signed by           Neelima Contreras DO  03/11/19 2538

## 2019-03-11 NOTE — NURSING NOTE
Iv access removed, flu vaccination provided to patient, belongings gathered by patient, discharge education provided to patient and wife, both stated understanding, prescriptions handed to patient, left floor via walking, discharged to home

## 2019-03-11 NOTE — ASSESSMENT & PLAN NOTE
· Atypical presentation for ACS  · Patient has known history of multivessel CAD and some small vessel disease  · Serial cardiac enzymes were negative  · Lipid panel showed elevated LDL  · Telemetry did not show any evidence of arrhythmias  · Patient underwent nuclear stress test which showed no evidence of reversible ischemia  · Cardiology consult appreciated  · Patient was added on antianginal medication with Imdur and nitroglycerin p r n   To follow up with primary cardiology

## 2019-03-11 NOTE — H&P
H&P- Gui Inman 1960, 66 y o  male MRN: [id number]    Unit/Bed#: ED 03 Encounter: [id number]    Primary Care Provider: Krista Day MD   Date and time admitted to hospital: 3/10/2019 10:05 PM        * Chest pain  Assessment & Plan  Patient laid down to go to sleep around 9PM and developed a sharp pain in the left chest wall which was worse with deep inspiration  The pain radiated down the left arm  Pain lasted about an hour in total and resolved  CTA unremarkable  · Admit to Medicine  · Serial EKGs and troponin  · Aspirin, statin  · Cardiology consultation  · Lipid panel and hemoglobin A1c in the a m  · Patient had a stress test in May - will defer to cardiology for work up      Essential hypertension  Assessment & Plan  Continue lisinopril metoprolol    Hyperlipidemia LDL goal <100  Assessment & Plan  Continue statin    Coronary artery disease involving native coronary artery  Assessment & Plan  Continue dual anti-platelet therapy, statin  Lipid plan on the morning    Esophageal reflux  Assessment & Plan  Continue PPI    VTE Prophylaxis: Enoxaparin (Lovenox)  / sequential compression device   Code Status: Prior    Anticipated Length of Stay:  Patient will be admitted on an Observation basis with an anticipated length of stay of less than 2 midnights  Justification for Hospital Stay: chest pain    Total Time for Visit, including Counseling / Coordination of Care: 20 minutes  Greater than 50% of this total time spent on direct patient counseling and coordination of care  Chief Complaint:   Chest Pain (Pt states he has 4/10 CP that began 1 hour ago )      History of Present Illness:    Gui Inman is a 66 y o  male with a PMH of hypertension, coronary artery disease status post stent, GERD who presents with complaints of chest pain  Patient states he has been in his usual state of health  He went to bed last night and developed left-sided chest pain    It was stabbing and radiated to his left arm  It was a little bit worse when he took a breath  This lasted for about an hour prompting him to come to the emergency department  It resolved prior to coming to the ER  He had a stress test which was normal in May  He is admitted for cardiology consult  Review of Systems:    Review of Systems   Constitutional: Negative  HENT: Negative  Eyes: Negative  Respiratory: Negative  Cardiovascular: Positive for chest pain  Gastrointestinal: Negative  Endocrine: Negative  Genitourinary: Negative  Musculoskeletal: Negative  Skin: Negative  Allergic/Immunologic: Negative  Neurological: Negative  Hematological: Negative  Psychiatric/Behavioral: Negative  Past Medical and Surgical History:     Past Medical History:   Diagnosis Date    Complete tear of rotator cuff 04/01/2008    Coronary artery disease     GERD (gastroesophageal reflux disease)     Hx of heart artery stent     Hypertension     MI (myocardial infarction) ([facility] [address] )        Past Surgical History:   Procedure Laterality Date    CARDIAC SURGERY         Meds/Allergies:    Prior to Admission medications    Medication Sig Start Date End Date Taking?  Authorizing Provider   aspirin (ECOTRIN LOW STRENGTH) 81 mg EC tablet Take 81 mg by mouth daily   Yes Historical Provider, MD   clopidogrel (PLAVIX) 75 mg tablet Take 75 mg by mouth daily   Yes Historical Provider, MD   lisinopril (ZESTRIL) 2 5 mg tablet Take 2 5 mg by mouth daily   Yes Historical Provider, MD   metoprolol tartrate (LOPRESSOR) 50 mg tablet Take 50 mg by mouth daily     Yes Historical Provider, MD   pravastatin (PRAVACHOL) 40 mg tablet Take 2 tablets (80 mg total) by mouth daily 5/11/18  Yes Lucy Hinkle DO   ranitidine (ZANTAC) 150 MG capsule Take 1 capsule (150 mg total) by mouth 2 (two) times a day 2/9/18  Yes Magnus Zamora DO       Allergies: No Known Allergies    Social History:     Marital Status: Single   Substance Use History: Social History     Substance and Sexual Activity   Alcohol Use No    Comment: occasional     Social History     Tobacco Use   Smoking Status Former Smoker    Packs/day: 1 50    Last attempt to quit: 10/11/2017    Years since quittin 4   Smokeless Tobacco Never Used   Tobacco Comment    per allscripts current every day smoker     Social History     Substance and Sexual Activity   Drug Use No       Family History:    Family History   Problem Relation Age of Onset    Leukemia Mother     Cancer Mother     Cancer Father        Physical Exam:     Vitals:   Blood Pressure: 123/82 (19)  Pulse: 60 (19)  Temperature: 97 6 °F (36 4 °C) (03/10/19 2206)  Temp Source: Oral (03/10/19 2206)  Respirations: 13 (19)  Height: 5' 10" (177 8 cm) (03/10/19 2206)  Weight - Scale: 79 4 kg (175 lb) (03/10/19 2206)  SpO2: 99 % (19)    Physical Exam   Constitutional: He is oriented to person, place, and time  He appears well-developed and well-nourished  HENT:   Head: Normocephalic and atraumatic  Eyes: Pupils are equal, round, and reactive to light  EOM are normal    Neck: Normal range of motion  Cardiovascular: Normal rate, regular rhythm and normal heart sounds  Pulmonary/Chest: Effort normal and breath sounds normal    Abdominal: Soft  Bowel sounds are normal  He exhibits no distension  Musculoskeletal: Normal range of motion  Neurological: He is alert and oriented to person, place, and time  Skin: Skin is warm and dry  Nursing note and vitals reviewed  Additional Data:     Lab Results: I have personally reviewed pertinent reports        Results from last 7 days   Lab Units 03/10/19  2219   WBC Thousand/uL 10 55*   HEMOGLOBIN g/dL 14 9   HEMATOCRIT % 48 6   PLATELETS Thousands/uL 273   NEUTROS PCT % 41*     Results from last 7 days   Lab Units 03/10/19  2219   SODIUM mmol/L 137   POTASSIUM mmol/L 3 9   CHLORIDE mmol/L 104   CO2 mmol/L 23   BUN mg/dL 14 CREATININE mg/dL 1 03   CALCIUM mg/dL 9 3   TOTAL BILIRUBIN mg/dL 0 30   ALK PHOS U/L 69   ALT U/L 22   AST U/L 15     Results from last 7 days   Lab Units 03/10/19  2244   INR  0 90     Results from last 7 days   Lab Units 03/11/19  0125   TROPONIN I ng/mL <0 02               Imaging: I have personally reviewed pertinent reports  CTA dissection protocol chest abdomen pelvis w wo contrast   Final Result by Thelma Monroy MD (03/11 0019)      No evidence of aortic aneurysm or dissection  No evidence of acute pathology throughout the chest, abdomen or pelvis  Workstation performed: [id number]         X-ray chest 1 view portable    (Results Pending)       CTA dissection protocol chest abdomen pelvis w wo contrast   Final Result      No evidence of aortic aneurysm or dissection  No evidence of acute pathology throughout the chest, abdomen or pelvis  Workstation performed: [id number]         X-ray chest 1 view portable    (Results Pending)       EKG, Pathology, and Other Studies Reviewed on Admission:   · EKG: NSR with no ischemic changes    Allscripts / Epic Records Reviewed: Yes     ** Please Note: This note has been constructed using a voice recognition system   **

## 2019-03-11 NOTE — CONSULTS
Consultation - Cardiology   Amara Miranda 66 y o  male MRN: [id number]  Unit/Bed#: [address] [place], [place] [id number] Encounter: [id number]    Assessment/Plan     Assessment:  1  Chest pain in a patient with known coronary artery disease  2  Dyslipidemia poorly controlled  3  Hypertension  4  GERD     Plan:  Patient has been admitted to the hospitalist service  1  Will transition patient's Pravachol to Lipitor, starting at 40 mg and if patient tolerates will increase to 80 mg once a day  2  Continue dual anti-platelet therapy as she was taking pre-hospital in addition to his beta-blocker and low-dose ACE-inhibitor  3  We will discuss with Dr Gaudencio Kenny regarding possibly of direct visualization of coronaries due to his history of residual coronary artery disease, recurrent chest pain and nondiagnostic stress test   4  Consider GI workup as outpatient for GERD/esophagitis  History of Present Illness   Physician Requesting Consult: Steve Lorenz MD  Reason for Consult / Principal Problem:  Chest pain  HPI: Amara Miranda is a 66y o  year old male who presented to the emergency room with complaints of midsternal chest pain with radiation to his left arm  He does noted was approximately 4 to 5/10, seem to worsen with deep inspiration and was relieved with morphine in the emergency room  He described the discomfort as a sharp stabbing pain  Patient does have a history of previous inferior wall MI in 2017 which at that time he underwent PCI with placement of a proximal and mid RCA stents [facility]   He follows with Dr Jonathan Daily  Patient notes decreased activity over the last year  Total cholesterol is 186, triglycerides 144, HDL is 27 with an LDL of 130 on Pravachol 80 mg  Troponins were negative x3  Cardiac catheterization performed in 2017, noted a 99% blockage of his RCA proximally which he underwent stenting    Patient also is noted to have residual disease of 90% distal LAD, 50% to 60% diagonal 1 and diagonal 2 and 40% ostial left circumflex lesion  Nuclear stress testing performed in May of 2018, patient exercised for approximately 8 minutes and scans did not demonstrate any ischemia  Inpatient consult to Cardiology  Consult performed by: Ragena Dandy, CRNP  Consult ordered by: HUMA Villasenor          Review of Systems   Constitutional: Positive for fatigue  Negative for activity change, appetite change, chills and unexpected weight change  HENT: Negative for congestion, ear pain, postnasal drip, sinus pain, tinnitus and voice change  Eyes: Negative for photophobia, redness and visual disturbance  Respiratory: Negative for apnea, cough, shortness of breath and wheezing  Cardiovascular: Positive for chest pain  Negative for palpitations and leg swelling  Gastrointestinal: Negative for abdominal distention, abdominal pain, diarrhea, nausea and vomiting  Endocrine: Negative for polydipsia, polyphagia and polyuria  Genitourinary: Negative  Musculoskeletal: Negative  Allergic/Immunologic: Negative  Neurological: Negative  Hematological: Negative  Psychiatric/Behavioral: Negative          Historical Information   Past Medical History:   Diagnosis Date    Complete tear of rotator cuff 2008    Coronary artery disease     GERD (gastroesophageal reflux disease)     Hx of heart artery stent     Hypertension     MI (myocardial infarction) ([facility] [address] )      Past Surgical History:   Procedure Laterality Date    CARDIAC SURGERY       Social History     Substance and Sexual Activity   Alcohol Use Not Currently    Comment: occasional     Social History     Substance and Sexual Activity   Drug Use No     Social History     Tobacco Use   Smoking Status Former Smoker    Packs/day: 1 50    Last attempt to quit: 10/11/2017    Years since quittin 4   Smokeless Tobacco Never Used   Tobacco Comment    per allscripts current every day smoker     Family History:   Family History   Problem Relation Age of Onset    Leukemia Mother     Cancer Mother     Cancer Father        Meds/Allergies   all current active meds have been reviewed, current meds:   Current Facility-Administered Medications   Medication Dose Route Frequency    aspirin (ECOTRIN LOW STRENGTH) EC tablet 81 mg  81 mg Oral Daily    atorvastatin (LIPITOR) tablet 40 mg  40 mg Oral Daily With Dinner    clopidogrel (PLAVIX) tablet 75 mg  75 mg Oral Daily    famotidine (PEPCID) tablet 20 mg  20 mg Oral BID    influenza vaccine, recombinant, quadrivalent (FLUBLOK) IM injection 0 5 mL  0 5 mL Intramuscular Prior to discharge    lisinopril (ZESTRIL) tablet 2 5 mg  2 5 mg Oral Daily    metoprolol tartrate (LOPRESSOR) tablet 50 mg  50 mg Oral Daily    and PTA meds:   Prior to Admission Medications   Prescriptions Last Dose Informant Patient Reported? Taking?   aspirin (ECOTRIN LOW STRENGTH) 81 mg EC tablet 3/10/2019 at Unknown time  Yes Yes   Sig: Take 81 mg by mouth daily   clopidogrel (PLAVIX) 75 mg tablet 3/10/2019 at Unknown time  Yes Yes   Sig: Take 75 mg by mouth daily   lisinopril (ZESTRIL) 2 5 mg tablet 3/10/2019 at Unknown time  Yes Yes   Sig: Take 2 5 mg by mouth daily   metoprolol tartrate (LOPRESSOR) 50 mg tablet 3/10/2019 at Unknown time Self Yes Yes   Sig: Take 50 mg by mouth daily     pravastatin (PRAVACHOL) 40 mg tablet 3/10/2019 at Unknown time  No Yes   Sig: Take 2 tablets (80 mg total) by mouth daily   ranitidine (ZANTAC) 150 MG capsule 3/9/2019 at Unknown time  No Yes   Sig: Take 1 capsule (150 mg total) by mouth 2 (two) times a day      Facility-Administered Medications: None     No Known Allergies    Objective   Vitals: Blood pressure 110/72, pulse 58, temperature 97 9 °F (36 6 °C), temperature source Oral, resp  rate 18, height 5' 10" (1 778 m), weight 80 2 kg (176 lb 12 9 oz), SpO2 100 %    Orthostatic Blood Pressures      Most Recent Value   Blood Pressure  110/72 filed at 03/11/2019 0740   Patient Position - Orthostatic VS  Sitting filed at 03/11/2019 0740          No intake or output data in the 24 hours ending 03/11/19 0858    Invasive Devices     Peripheral Intravenous Line            Peripheral IV 03/10/19 Left Antecubital less than 1 day    Peripheral IV 03/10/19 Left Hand less than 1 day                Physical Exam   Constitutional: He is oriented to person, place, and time  He appears well-developed and well-nourished  No distress  HENT:   Head: Normocephalic and atraumatic  Right Ear: External ear normal    Left Ear: External ear normal    Eyes: Pupils are equal, round, and reactive to light  Conjunctivae and EOM are normal  Right eye exhibits no discharge  Left eye exhibits no discharge  Neck: Normal range of motion  Neck supple  No JVD present  No thyromegaly present  Cardiovascular: Normal rate, regular rhythm, normal heart sounds and intact distal pulses  No murmur heard  Pulmonary/Chest: Effort normal and breath sounds normal  No respiratory distress  He has no wheezes  He exhibits no tenderness  Abdominal: Soft  Bowel sounds are normal  He exhibits no distension  Musculoskeletal: He exhibits no edema  Neurological: He is alert and oriented to person, place, and time  No cranial nerve deficit  Skin: Skin is warm and dry  Capillary refill takes less than 2 seconds  No rash noted  He is not diaphoretic  No erythema  No pallor  Psychiatric: He has a normal mood and affect  Nursing note and vitals reviewed  Lab Results: I have personally reviewed pertinent lab results  Imaging: I have personally reviewed pertinent reports      EKG:  Sinus rhythm with an incomplete right bundle-branch block  VTE Prophylaxis: Sequential compression device Bernetta Peek)     Code Status: Level 1 - Full Code  Advance Directive and Living Will:      Power of :    POLST:      Robin Doll, [facility]

## 2019-03-11 NOTE — ASSESSMENT & PLAN NOTE
Patient laid down to go to sleep around 9PM and developed a sharp pain in the left chest wall which was worse with deep inspiration  The pain radiated down the left arm  Pain lasted about an hour in total and resolved  CTA unremarkable  · Admit to Medicine  · Serial EKGs and troponin  · Aspirin, statin  · Cardiology consultation  · Lipid panel and hemoglobin A1c in the a m    · Patient had a stress test in May - will defer to cardiology for work up

## 2019-03-11 NOTE — ASSESSMENT & PLAN NOTE
Continue dual anti-platelet therapy with aspirin and Plavix  Patient's Pravachol was changed to Lipitor 80 milligram p o   Daily due to elevated LDL

## 2019-03-11 NOTE — SOCIAL WORK
Met with pt to screen  Wife in the room also  Pt is independent at home, works, and drives  Here for CP  Explained role of cm, no d/c needs

## 2019-03-12 ENCOUNTER — TRANSITIONAL CARE MANAGEMENT (OUTPATIENT)
Dept: FAMILY MEDICINE CLINIC | Facility: CLINIC | Age: 59
End: 2019-03-12

## 2019-03-12 LAB — MRSA NOSE QL CULT: NORMAL

## 2019-03-12 NOTE — UTILIZATION REVIEW
Notification of Observation Care Status/Observation Authorization Request  This is a Notification of Observation Care Status to our facility [address] [place] [address]  Please be advised that this patient is currently in our facility under Observation Status  Below you will find the Attending Physician and Facilitys information including NPI# and contact information for the Utilization  assigned to the [facility] where the patient is receiving services  Please feel free to contact the Utilization Review Department with any questions  Place of Service Code: 25  Place of Service Name: On [address] [place] [address] for Observation:   HOURS IN OBSERVATION STATUS: 16 hours    PRESENTATION DATE/TIME: 3/10/2019 10:05 PM  OBS ADMISSION DATE/TIME:  Admission Orders (From admission, onward)    Ordered        03/11/19 0137  Place in Observation  Once     Order ID Start Status   [id number] 03/11/19 0138 Completed              DISCHARGE DATE/TIME: 3/11/2019  5:25 PM   DISPOSITION: Home/Self Care  Attending Physician: Francisco Javier Yang Md [[id number]] [unfilled]    Facility: [address] [place] [address] Utilization Review Department  Phone: [phone]; Fax [phone]  [email or web address]  ATTENTION: Please call with any questions or concerns to [phone]  and carefully listen to the prompts so that you are directed to the right person  Send all requests for admission clinical reviews, approved or denied determinations and any other requests to fax [phone]   All voicemails are confidential

## 2021-04-08 DIAGNOSIS — Z23 ENCOUNTER FOR IMMUNIZATION: ICD-10-CM

## 2021-04-13 ENCOUNTER — TELEPHONE (OUTPATIENT)
Dept: FAMILY MEDICINE CLINIC | Facility: CLINIC | Age: 61
End: 2021-04-13

## 2021-04-21 ENCOUNTER — IMMUNIZATIONS (OUTPATIENT)
Dept: FAMILY MEDICINE CLINIC | Facility: HOSPITAL | Age: 61
End: 2021-04-21

## 2021-04-21 DIAGNOSIS — Z23 ENCOUNTER FOR IMMUNIZATION: Primary | ICD-10-CM

## 2021-04-21 PROCEDURE — 91300 SARS-COV-2 / COVID-19 MRNA VACCINE (PFIZER-BIONTECH) 30 MCG: CPT

## 2021-04-21 PROCEDURE — 0001A SARS-COV-2 / COVID-19 MRNA VACCINE (PFIZER-BIONTECH) 30 MCG: CPT

## 2021-05-15 ENCOUNTER — IMMUNIZATIONS (OUTPATIENT)
Dept: FAMILY MEDICINE CLINIC | Facility: HOSPITAL | Age: 61
End: 2021-05-15

## 2021-05-15 DIAGNOSIS — Z23 ENCOUNTER FOR IMMUNIZATION: Primary | ICD-10-CM

## 2021-05-15 PROCEDURE — 0002A SARS-COV-2 / COVID-19 MRNA VACCINE (PFIZER-BIONTECH) 30 MCG: CPT

## 2021-05-15 PROCEDURE — 91300 SARS-COV-2 / COVID-19 MRNA VACCINE (PFIZER-BIONTECH) 30 MCG: CPT

## 2021-12-17 ENCOUNTER — OFFICE VISIT (OUTPATIENT)
Dept: FAMILY MEDICINE CLINIC | Facility: CLINIC | Age: 61
End: 2021-12-17
Payer: COMMERCIAL

## 2021-12-17 VITALS
BODY MASS INDEX: 27.58 KG/M2 | HEART RATE: 67 BPM | RESPIRATION RATE: 16 BRPM | SYSTOLIC BLOOD PRESSURE: 140 MMHG | HEIGHT: 71 IN | DIASTOLIC BLOOD PRESSURE: 80 MMHG | OXYGEN SATURATION: 100 % | TEMPERATURE: 98.2 F | WEIGHT: 197 LBS

## 2021-12-17 DIAGNOSIS — I25.110 CORONARY ARTERY DISEASE INVOLVING NATIVE CORONARY ARTERY OF NATIVE HEART WITH UNSTABLE ANGINA PECTORIS (HCC): ICD-10-CM

## 2021-12-17 DIAGNOSIS — E78.5 HYPERLIPIDEMIA LDL GOAL <100: ICD-10-CM

## 2021-12-17 DIAGNOSIS — I73.9 CLAUDICATION (HCC): Primary | ICD-10-CM

## 2021-12-17 DIAGNOSIS — Z23 NEED FOR VACCINATION: ICD-10-CM

## 2021-12-17 PROBLEM — J39.2 THROAT DISORDER: Status: RESOLVED | Noted: 2017-02-28 | Resolved: 2021-12-17

## 2021-12-17 PROBLEM — Z95.5 STENTED CORONARY ARTERY: Status: ACTIVE | Noted: 2020-10-28

## 2021-12-17 PROBLEM — Z87.891 EX-SMOKER: Status: ACTIVE | Noted: 2020-10-29

## 2021-12-17 PROBLEM — I21.9 MYOCARDIAL INFARCTION (HCC): Status: ACTIVE | Noted: 2017-10-03

## 2021-12-17 PROBLEM — J44.9 COPD (CHRONIC OBSTRUCTIVE PULMONARY DISEASE) (HCC): Status: ACTIVE | Noted: 2021-12-17

## 2021-12-17 PROBLEM — I21.9 MYOCARDIAL INFARCTION (HCC): Status: RESOLVED | Noted: 2017-10-03 | Resolved: 2021-12-17

## 2021-12-17 PROBLEM — L21.9 SEBORRHEIC DERMATITIS: Status: RESOLVED | Noted: 2019-01-17 | Resolved: 2021-12-17

## 2021-12-17 PROBLEM — R07.9 CHEST PAIN: Status: RESOLVED | Noted: 2018-05-11 | Resolved: 2021-12-17

## 2021-12-17 PROBLEM — R94.31 ABNORMAL EKG: Status: ACTIVE | Noted: 2020-10-29

## 2021-12-17 PROBLEM — J30.2 SEASONAL ALLERGIES: Status: ACTIVE | Noted: 2020-10-29

## 2021-12-17 PROCEDURE — 99214 OFFICE O/P EST MOD 30 MIN: CPT | Performed by: FAMILY MEDICINE

## 2021-12-17 PROCEDURE — 1036F TOBACCO NON-USER: CPT | Performed by: FAMILY MEDICINE

## 2021-12-17 PROCEDURE — 90471 IMMUNIZATION ADMIN: CPT

## 2021-12-17 PROCEDURE — 3725F SCREEN DEPRESSION PERFORMED: CPT | Performed by: FAMILY MEDICINE

## 2021-12-17 PROCEDURE — 90682 RIV4 VACC RECOMBINANT DNA IM: CPT

## 2021-12-17 PROCEDURE — 3008F BODY MASS INDEX DOCD: CPT | Performed by: FAMILY MEDICINE

## 2021-12-17 RX ORDER — ROSUVASTATIN CALCIUM 20 MG/1
TABLET, COATED ORAL
COMMUNITY
Start: 2021-09-16

## 2021-12-17 RX ORDER — FAMOTIDINE 20 MG/1
20 TABLET, FILM COATED ORAL 2 TIMES DAILY
COMMUNITY

## 2021-12-17 RX ORDER — METOPROLOL SUCCINATE 50 MG/1
TABLET, EXTENDED RELEASE ORAL DAILY
COMMUNITY

## 2021-12-29 ENCOUNTER — HOSPITAL ENCOUNTER (OUTPATIENT)
Dept: RADIOLOGY | Facility: HOSPITAL | Age: 61
Discharge: HOME | End: 2021-12-29
Payer: COMMERCIAL

## 2021-12-29 DIAGNOSIS — I73.9 CLAUDICATION (HCC): ICD-10-CM

## 2021-12-29 PROCEDURE — 93923 UPR/LXTR ART STDY 3+ LVLS: CPT | Performed by: SURGERY

## 2021-12-29 PROCEDURE — 93923 UPR/LXTR ART STDY 3+ LVLS: CPT

## 2022-06-03 ENCOUNTER — OFFICE VISIT (OUTPATIENT)
Dept: FAMILY MEDICINE CLINIC | Facility: CLINIC | Age: 62
End: 2022-06-03
Payer: COMMERCIAL

## 2022-06-03 VITALS
DIASTOLIC BLOOD PRESSURE: 80 MMHG | BODY MASS INDEX: 27.86 KG/M2 | SYSTOLIC BLOOD PRESSURE: 130 MMHG | WEIGHT: 199 LBS | HEIGHT: 71 IN | HEART RATE: 76 BPM | RESPIRATION RATE: 16 BRPM | TEMPERATURE: 99.3 F

## 2022-06-03 DIAGNOSIS — J01.00 ACUTE NON-RECURRENT MAXILLARY SINUSITIS: Primary | ICD-10-CM

## 2022-06-03 DIAGNOSIS — J44.9 CHRONIC OBSTRUCTIVE PULMONARY DISEASE, UNSPECIFIED COPD TYPE (HCC): ICD-10-CM

## 2022-06-03 DIAGNOSIS — I10 ESSENTIAL HYPERTENSION: ICD-10-CM

## 2022-06-03 PROCEDURE — 3008F BODY MASS INDEX DOCD: CPT | Performed by: NURSE PRACTITIONER

## 2022-06-03 PROCEDURE — 1036F TOBACCO NON-USER: CPT | Performed by: NURSE PRACTITIONER

## 2022-06-03 PROCEDURE — 99214 OFFICE O/P EST MOD 30 MIN: CPT | Performed by: NURSE PRACTITIONER

## 2022-06-03 PROCEDURE — 3725F SCREEN DEPRESSION PERFORMED: CPT | Performed by: NURSE PRACTITIONER

## 2022-06-03 RX ORDER — FLUTICASONE PROPIONATE 50 MCG
2 SPRAY, SUSPENSION (ML) NASAL DAILY
Qty: 16 G | Refills: 1 | Status: SHIPPED | OUTPATIENT
Start: 2022-06-03

## 2022-06-03 RX ORDER — CLOPIDOGREL BISULFATE 75 MG/1
75 TABLET ORAL DAILY
COMMUNITY
Start: 2022-05-27

## 2022-06-03 RX ORDER — AZITHROMYCIN 250 MG/1
TABLET, FILM COATED ORAL
Qty: 6 TABLET | Refills: 0 | Status: SHIPPED | OUTPATIENT
Start: 2022-06-03 | End: 2022-06-08

## 2022-06-03 NOTE — PROGRESS NOTES
Assessment/Plan:    Take medications as directed  F/u for any persistent/worsening symptoms    1  Acute non-recurrent maxillary sinusitis  -     fluticasone (FLONASE) 50 mcg/act nasal spray; 2 sprays into each nostril daily  -     azithromycin (ZITHROMAX) 250 mg tablet; 2 tabs PO day 1, then 1 tab PO days 2-5    2  Essential hypertension  Assessment & Plan:  BP stable on medications       3  Chronic obstructive pulmonary disease, unspecified COPD type ([facility] [address] )  Assessment & Plan:  Not on any inhalers at present, stable/              There are no Patient Instructions on file for this visit  Return if symptoms worsen or fail to improve  Subjective:      Patient ID: Jorge Alberto Sam is a 63 y o  male  Chief Complaint   Patient presents with    Sinus pressure     Started a week ago  Ac/lpn    Cough     Zyrtec helps for a short period of time  He has had sinus congestion on and off for over a week  Has nasal congestion, difficult to blow nose  No significant cough, breathing difficulties, or fevers/chills  Takes Zyrtec, which only works for a few hours  Did a Covid test at home a couple of days ago  The following portions of the patient's history were reviewed and updated as appropriate: allergies, current medications, past family history, past medical history, past social history, past surgical history and problem list     Review of Systems   Constitutional: Negative for chills, fatigue and fever  HENT: Positive for congestion and sinus pressure  Negative for ear pain, postnasal drip, rhinorrhea and sore throat  Respiratory: Negative for cough, shortness of breath and wheezing  Cardiovascular: Negative for chest pain  Gastrointestinal: Negative for abdominal pain, diarrhea, nausea and vomiting  Musculoskeletal: Negative for arthralgias  Skin: Negative for rash  Neurological: Negative for headaches           Current Outpatient Medications   Medication Sig Dispense Refill    aspirin (ECOTRIN LOW STRENGTH) 81 mg EC tablet Take 81 mg by mouth daily      azithromycin (ZITHROMAX) 250 mg tablet 2 tabs PO day 1, then 1 tab PO days 2-5 6 tablet 0    clopidogrel (PLAVIX) 75 mg tablet Take 75 mg by mouth daily      famotidine (PEPCID) 20 mg tablet Take 20 mg by mouth 2 (two) times a day      fluticasone (FLONASE) 50 mcg/act nasal spray 2 sprays into each nostril daily 16 g 1    lisinopril (ZESTRIL) 2 5 mg tablet Take 2 5 mg by mouth daily      metoprolol succinate (TOPROL-XL) 50 mg 24 hr tablet in the morning      rosuvastatin (CRESTOR) 20 MG tablet Take 2 tablets on Sundays, and 1 tablet on the other days of the week  Take it at bedtime       No current facility-administered medications for this visit  Objective:    /80   Pulse 76   Temp 99 3 °F (37 4 °C)   Resp 16   Ht 5' 11" (1 803 m)   Wt 90 3 kg (199 lb)   BMI 27 75 kg/m²        Physical Exam  Vitals and nursing note reviewed  Constitutional:       Appearance: Normal appearance  He is well-developed  HENT:      Right Ear: Tympanic membrane normal       Left Ear: Tympanic membrane normal       Nose: Congestion present  Right Sinus: Maxillary sinus tenderness present  Left Sinus: Maxillary sinus tenderness present  Mouth/Throat:      Pharynx: Oropharynx is clear  Eyes:      Conjunctiva/sclera: Conjunctivae normal    Cardiovascular:      Rate and Rhythm: Normal rate and regular rhythm  Pulses: Normal pulses  Heart sounds: Normal heart sounds  No murmur heard  Pulmonary:      Effort: Pulmonary effort is normal       Breath sounds: Normal breath sounds  Skin:     General: Skin is warm and dry  Neurological:      Mental Status: He is alert     Psychiatric:         Mood and Affect: Mood normal          Behavior: Behavior normal                 HUAM Louis

## 2022-08-18 ENCOUNTER — OFFICE VISIT (OUTPATIENT)
Dept: FAMILY MEDICINE CLINIC | Facility: CLINIC | Age: 62
End: 2022-08-18
Payer: COMMERCIAL

## 2022-08-18 VITALS
SYSTOLIC BLOOD PRESSURE: 120 MMHG | DIASTOLIC BLOOD PRESSURE: 80 MMHG | WEIGHT: 199.8 LBS | OXYGEN SATURATION: 97 % | HEIGHT: 71 IN | BODY MASS INDEX: 27.97 KG/M2 | HEART RATE: 75 BPM | RESPIRATION RATE: 18 BRPM | TEMPERATURE: 96.7 F

## 2022-08-18 DIAGNOSIS — I25.110 CORONARY ARTERY DISEASE INVOLVING NATIVE CORONARY ARTERY OF NATIVE HEART WITH UNSTABLE ANGINA PECTORIS (HCC): Chronic | ICD-10-CM

## 2022-08-18 DIAGNOSIS — E78.5 HYPERLIPIDEMIA LDL GOAL <100: ICD-10-CM

## 2022-08-18 DIAGNOSIS — I10 ESSENTIAL HYPERTENSION: ICD-10-CM

## 2022-08-18 DIAGNOSIS — J01.90 ACUTE SINUSITIS, RECURRENCE NOT SPECIFIED, UNSPECIFIED LOCATION: Primary | ICD-10-CM

## 2022-08-18 PROBLEM — R00.2 PALPITATIONS: Status: ACTIVE | Noted: 2022-05-05

## 2022-08-18 PROBLEM — I73.9 PAD (PERIPHERAL ARTERY DISEASE) (HCC): Status: ACTIVE | Noted: 2021-12-23

## 2022-08-18 PROCEDURE — 99214 OFFICE O/P EST MOD 30 MIN: CPT | Performed by: NURSE PRACTITIONER

## 2022-08-18 PROCEDURE — 3074F SYST BP LT 130 MM HG: CPT | Performed by: NURSE PRACTITIONER

## 2022-08-18 PROCEDURE — 3079F DIAST BP 80-89 MM HG: CPT | Performed by: NURSE PRACTITIONER

## 2022-08-18 RX ORDER — AMOXICILLIN 875 MG/1
875 TABLET, COATED ORAL 2 TIMES DAILY
Qty: 20 TABLET | Refills: 0 | Status: SHIPPED | OUTPATIENT
Start: 2022-08-18 | End: 2022-08-28

## 2022-08-18 NOTE — PATIENT INSTRUCTIONS
Take medication with food  It is important that you take the entire course of antibiotics prescribed  May also take a probiotic of your choice to maintain healthy GI anita  Can take some probiotic and yogurt with the medication  Increase fluid intake, saline nasal rinses, and hot tea with honey and lemon  Cool air humidification can be helpful as well  May take Tylenol as needed for pain or fevers  Mucinex D for sinus congestion or Coricidin HBP if you have high blood pressure or a heart condition  Mucinex or Robitussin DM are effective for cough and chest congestion  Supportive care discussed and advised  Advised to RTO for any worsening and no improvement  Follow up for no improvement and worsening of conditions  Patient advised and educated when to see immediate medical care  Amoxicillin (By mouth)   Amoxicillin ([id number])  Treats infections or stomach ulcers  This medicine is a penicillin antibiotic  Brand Name(s): Moxatag, Prevpac   There may be other brand names for this medicine  When This Medicine Should Not Be Used: This medicine is not right for everyone  You should not use it if you had an allergic reaction to amoxicillin, any type of penicillin, or a cephalosporin antibiotic  How to Use This Medicine:   Capsule, Liquid, Tablet, Chewable Tablet, Long Acting Tablet  Your doctor will tell you how much medicine to use  Do not use more than directed  Chewable tablet: You must chew the tablet before you swallow it  You may crush the tablet and mix the medicine with a small amount of food to make it easier to swallow  Oral liquid: Shake well just before each use  Measure the oral liquid medicine with a marked measuring spoon, oral syringe, or medicine cup  You may mix the oral liquid with a baby formula, milk, fruit juice, water, ginger ale, or another cold drink  Be sure your child drinks all of the mixture right away    Tablet for suspension: Place the tablet in a small drinking glass, and add 2 teaspoons of water  Do not use any other liquid  Gently stir or swirl the water in the glass until the tablet is completely dissolved  Drink all of this mixture right away  Add more water to the glass and drink all of it to make sure you get all of the medicine  Do not chew or swallow the tablet for suspension  Take all of the medicine in your prescription to clear up your infection, even if you feel better after the first few doses  Take a dose as soon as you remember  If it is almost time for your next dose, wait until then and take a regular dose  Do not take extra medicine to make up for a missed dose  Store the tablets, capsules, and tablets for suspension at room temperature, away from heat, moisture, and direct light  Store the oral liquid in the refrigerator  Do not freeze  Throw away any unused medicine after 14 days  Drugs and Foods to Avoid:   Ask your doctor or pharmacist before using any other medicine, including over-the-counter medicines, vitamins, and herbal products  Some medicines can affect how amoxicillin works  Tell your doctor if you are also using any of the following:   Allopurinol  Probenecid  Birth control pills  A blood thinner  Warnings While Using This Medicine:   Tell your doctor if you are pregnant or breastfeeding, or if you have kidney disease, allergies, or a condition called phenylketonuria (PKU)  Tell your doctor if you are on dialysis  This medicine can cause diarrhea  Call your doctor if the diarrhea becomes severe, does not stop, or is bloody  Do not take any medicine to stop diarrhea until you have talked to your doctor  Diarrhea can occur 2 months or more after you stop taking this medicine  Tell any doctor or dentist who treats you that you are using this medicine  This medicine may affect certain medical test results  Call your doctor if your symptoms do not improve or if they get worse    Use this medicine to treat only the infection your doctor has prescribed it for  Do not use this medicine for any infection or condition that has not been checked by a doctor  This medicine will not treat the flu or the common cold  Keep all medicine out of the reach of children  Never share your medicine with anyone  Possible Side Effects While Using This Medicine:   Call your doctor right away if you notice any of these side effects: Allergic reaction: Itching or hives, swelling in your face or hands, swelling or tingling in your mouth or throat, chest tightness, trouble breathing  Blistering, peeling, or red skin rash  Diarrhea that may contain blood, stomach cramps, fever  If you notice these less serious side effects, talk with your doctor:   Mild diarrhea, nausea, or vomiting  Mild skin rash  If you notice other side effects that you think are caused by this medicine, tell your doctor  Call your doctor for medical advice about side effects  You may report side effects to FDA at 4-462-FDA-6881    © Copyright [employer] 2022 Information is for End User's use only and may not be sold, redistributed or otherwise used for commercial purposes  The above information is an  only  It is not intended as medical advice for individual conditions or treatments  Talk to your doctor, nurse or pharmacist before following any medical regimen to see if it is safe and effective for you

## 2022-08-18 NOTE — PROGRESS NOTES
Assessment/Plan:    1  Acute sinusitis, recurrence not specified, unspecified location  -     amoxicillin (AMOXIL) 875 mg tablet; Take 1 tablet (875 mg total) by mouth 2 (two) times a day for 10 days    2  Coronary artery disease involving native coronary artery of native heart with unstable angina pectoris [facility])  Comments:  managed by cardiologist    3  Essential hypertension  Comments:  stable with current regimen    4  Hyperlipidemia LDL goal <100  Comments:  complaint with statin and tolerating it well          Patient Instructions: Take medication with food  It is important that you take the entire course of antibiotics prescribed  May also take a probiotic of your choice to maintain healthy GI anita  Can take some probiotic and yogurt with the medication  Increase fluid intake, saline nasal rinses, and hot tea with honey and lemon  Cool air humidification can be helpful as well  May take Tylenol as needed for pain or fevers  Mucinex D for sinus congestion or Coricidin HBP if you have high blood pressure or a heart condition  Mucinex or Robitussin DM are effective for cough and chest congestion  Supportive care discussed and advised  Advised to RTO for any worsening and no improvement  Follow up for no improvement and worsening of conditions  Patient advised and educated when to see immediate medical care  Return if symptoms worsen or fail to improve  No future appointments  Subjective:      Patient ID: Shade Enamorado is a 63 y o  male  Chief Complaint   Patient presents with    Sinusitis     [facility]           Vitals:  /80   Pulse 75   Temp (!) 96 7 °F (35 9 °C)   Resp 18   Ht 5' 11" (1 803 m)   Wt 90 6 kg (199 lb 12 8 oz)   SpO2 97%   BMI 27 87 kg/m²     HPI  Patient started with cough and congestion couple of days ago and progressed to sinus pressure  Denies fever, chills and sob  Tried OTC but no relief  Vaccinated for covid-19 with booster   Did home covid-19 test which came back negative  Complaint with medications for chronic illnesses and tolerating it well  The following portions of the patient's history were reviewed and updated as appropriate: allergies, current medications, past family history, past medical history, past social history, past surgical history and problem list       Review of Systems   Constitutional: Negative for chills, diaphoresis, fatigue, fever and unexpected weight change  HENT: Positive for congestion and sinus pressure  Negative for dental problem, drooling, ear discharge, ear pain, facial swelling, hearing loss, mouth sores, nosebleeds, postnasal drip, rhinorrhea, sinus pain, sneezing, sore throat, tinnitus, trouble swallowing and voice change  Respiratory: Negative for cough, chest tightness, shortness of breath and wheezing  Cardiovascular: Negative  Gastrointestinal: Negative for abdominal pain, constipation, diarrhea, nausea and vomiting  Skin: Negative  Neurological: Negative for dizziness, light-headedness and headaches  Objective:    Social History     Tobacco Use   Smoking Status Former Smoker    Packs/day: 1 50    Quit date: 10/11/2017    Years since quittin 8   Smokeless Tobacco Never Used   Tobacco Comment    per allscripts current every day smoker       Allergies:    Allergies   Allergen Reactions    [employer] - Food Allergy Vomiting         Current Outpatient Medications   Medication Sig Dispense Refill    amoxicillin (AMOXIL) 875 mg tablet Take 1 tablet (875 mg total) by mouth 2 (two) times a day for 10 days 20 tablet 0    aspirin (ECOTRIN LOW STRENGTH) 81 mg EC tablet Take 81 mg by mouth daily      clopidogrel (PLAVIX) 75 mg tablet Take 75 mg by mouth daily      famotidine (PEPCID) 20 mg tablet Take 20 mg by mouth 2 (two) times a day      fluticasone (FLONASE) 50 mcg/act nasal spray 2 sprays into each nostril daily 16 g 1    lisinopril (ZESTRIL) 2 5 mg tablet Take 2 5 mg by mouth daily      metoprolol succinate (TOPROL-XL) 50 mg 24 hr tablet in the morning      rosuvastatin (CRESTOR) 20 MG tablet Take 2 tablets on Sundays, and 1 tablet on the other days of the week  Take it at bedtime       No current facility-administered medications for this visit  Physical Exam  Vitals reviewed  Constitutional:       Appearance: Normal appearance  He is well-developed  HENT:      Head: Normocephalic  Right Ear: Tympanic membrane, ear canal and external ear normal       Left Ear: Tympanic membrane, ear canal and external ear normal       Nose: Nose normal       Right Sinus: No maxillary sinus tenderness or frontal sinus tenderness  Left Sinus: No maxillary sinus tenderness or frontal sinus tenderness  Mouth/Throat:      Mouth: No oral lesions  Pharynx: No oropharyngeal exudate or posterior oropharyngeal erythema  Cardiovascular:      Rate and Rhythm: Normal rate and regular rhythm  Heart sounds: Normal heart sounds  Pulmonary:      Effort: Pulmonary effort is normal       Breath sounds: Normal breath sounds  Musculoskeletal:         General: Normal range of motion  Cervical back: Neck supple  Lymphadenopathy:      Cervical:      Right cervical: No superficial or posterior cervical adenopathy  Left cervical: No superficial or posterior cervical adenopathy  Skin:     General: Skin is warm and dry  Neurological:      Mental Status: He is alert and oriented to person, place, and time  Psychiatric:         Behavior: Behavior normal          Thought Content:  Thought content normal          Judgment: Judgment normal                      HUMA Borges

## 2022-12-14 ENCOUNTER — HOSPITAL ENCOUNTER (EMERGENCY)
Facility: HOSPITAL | Age: 62
Discharge: HOME | End: 2022-12-14
Attending: EMERGENCY MEDICINE

## 2022-12-14 VITALS
BODY MASS INDEX: 27.55 KG/M2 | SYSTOLIC BLOOD PRESSURE: 179 MMHG | WEIGHT: 197.53 LBS | TEMPERATURE: 97.4 F | OXYGEN SATURATION: 99 % | RESPIRATION RATE: 22 BRPM | DIASTOLIC BLOOD PRESSURE: 95 MMHG | HEART RATE: 88 BPM

## 2022-12-14 DIAGNOSIS — J32.9 SINUSITIS: ICD-10-CM

## 2022-12-14 DIAGNOSIS — R09.81 NASAL CONGESTION: Primary | ICD-10-CM

## 2022-12-14 LAB
FLUAV RNA RESP QL NAA+PROBE: NEGATIVE
FLUBV RNA RESP QL NAA+PROBE: NEGATIVE
RSV RNA RESP QL NAA+PROBE: NEGATIVE
SARS-COV-2 RNA RESP QL NAA+PROBE: NEGATIVE

## 2022-12-14 RX ORDER — FLUTICASONE PROPIONATE 50 MCG
1 SPRAY, SUSPENSION (ML) NASAL ONCE
Status: COMPLETED | OUTPATIENT
Start: 2022-12-14 | End: 2022-12-14

## 2022-12-14 RX ORDER — PSEUDOEPHEDRINE HCL 30 MG
60 TABLET ORAL ONCE
Status: COMPLETED | OUTPATIENT
Start: 2022-12-14 | End: 2022-12-14

## 2022-12-14 RX ORDER — NAPROXEN 500 MG/1
500 TABLET ORAL 2 TIMES DAILY WITH MEALS
Qty: 30 TABLET | Refills: 0 | Status: SHIPPED | OUTPATIENT
Start: 2022-12-14

## 2022-12-14 RX ORDER — PSEUDOEPHEDRINE HCL 30 MG
60 TABLET ORAL EVERY 8 HOURS PRN
Qty: 30 TABLET | Refills: 0 | Status: SHIPPED | OUTPATIENT
Start: 2022-12-14

## 2022-12-14 RX ORDER — OXYMETAZOLINE HYDROCHLORIDE 0.05 G/100ML
2 SPRAY NASAL ONCE
Status: COMPLETED | OUTPATIENT
Start: 2022-12-14 | End: 2022-12-14

## 2022-12-14 RX ORDER — NAPROXEN 500 MG/1
500 TABLET ORAL ONCE
Status: COMPLETED | OUTPATIENT
Start: 2022-12-14 | End: 2022-12-14

## 2022-12-14 RX ADMIN — PSEUDOEPHEDRINE HCL 60 MG: 30 TABLET, FILM COATED ORAL at 22:59

## 2022-12-14 RX ADMIN — FLUTICASONE PROPIONATE 1 SPRAY: 50 SPRAY, METERED NASAL at 23:02

## 2022-12-14 RX ADMIN — NAPROXEN 500 MG: 500 TABLET ORAL at 22:59

## 2022-12-14 RX ADMIN — OXYMETAZOLINE HYDROCHLORIDE 2 SPRAY: 0.05 SPRAY NASAL at 23:03

## 2022-12-15 NOTE — ED PROVIDER NOTES
Final Diagnosis:  1  Nasal congestion    2  Sinusitis        Chief Complaint   Patient presents with   • Nasal Congestion     Started at noon today with runny nose, now has headache had tylenol 1 hour ago and zyrtec at 2pm     HPI  62 yo presents w/ runny nose, bothersome  Also w/ headache  Clear rhinorrhea  No purulence  Some sinus tenderness  No systemic symptoms like fever  No n/v  Started today at 2pm  Tried some otc  No couhg  No resp distress  No gi issues chest pain couhg      - No language barrier    - History obtained from patient  - There are n limitations to the history obtained  - Previous charting underwent limited review with attention to last ED visits, labs, ekgs, and prior imaging  PMH:   has a past medical history of Complete tear of rotator cuff (04/01/2008), [facility]ch[facility] (12/17/2021), Coronary artery disease, GERD (gastroesophageal reflux disease), heart artery stent, Hypertension, and MI (myocardial infarction) ([facility] )  PSH:   has a past surgical history that includes Cardiac surgery  Social History:    Tobacco Use: Medium Risk   • Smoking Tobacco Use: Former   • Smokeless Tobacco Use: Never   • Passive Exposure: Not on file     Alcohol Use: Not on file     Patient with no illicit use    ROS:    Pertinent positives/negatives:   Review of Systems   HENT: Positive for congestion, rhinorrhea, sinus pressure and sinus pain  Neurological: Positive for headaches  CONSTITUTIONAL:  No dizziness  No weakness  No unexpected weight loss  EYES:  No pain, erythema, or discharge  No loss of vision  ENT:  No tinnitus, decreased hearing  No epistaxis/purulent drainage  No voice change, airway closing, trismus  CARDIOVASCULAR:  No chest pain  No palpitations  No new lower extremity edema  RESPIRATORY:  No purulent cough  No hemoptysis  No dyspnea  No paroxysmal nocturnal dyspnea  No stridor, audible wheezing bedside     GASTROINTESTINAL:  Normal appetite  No vomiting, diarrhea  No pain  No bloating  No melena  GENITOURINARY:  No frequency, urgency, nocturia  No hematuria or dysuria  No discharge  No sores/adenopathy  MUSCULOSKELETAL:  No arthralgias or myalgias that are new  INTEGUMENTARY:  No swelling  No unexpected contusions  No abrasions  No lymphangitis  NEUROLOGIC:  No meningismus  No numbness of the extremities  No new focal weakness  No postural instability  PSYCHIATRIC:  No SI HI AVH  HEMATOLOGICAL:  No bleeding  No petechiae  No bruising  ALLERGIES:  No urticaria  No sudden abd cramping  No stridor  PE:     Physical exam highlights:   Physical Exam       Vitals:    12/14/22 2232   BP: (!) 179/95   BP Location: Right arm   Pulse: 88   Resp: 22   Temp: (!) 97 4 °F (36 3 °C)   TempSrc: Tympanic   SpO2: 99%   Weight: 89 6 kg (197 lb 8 5 oz)     Vitals reviewed by me  Nursing note reviewed  Chaperone present for all sensitive exam   Const: No acute distress  Alert  Nontoxic  Not diaphoretic  HEENT: External ears normal  No protrusion drainage swelling  Nose normal  No drainage/traumatic deformity  MMM  Mouth with baseline/symmetric movement  No trismus  Eyes: No squinting  No icterus  Tracks through the room with normal EOM  No tearing/swelling/drainage  Neck: ROM normal  No rigidity  No meningismus  Cards: Rate as per vitals  Compared to monitor sinus unless documented above  Regular  Well perfused  Pulm: able to verbalize without additional effort  Effort and excursion normal  No disress  No audible wheezing/ stridor  Normal resp rate  Abd: No distension beyond baseline  No fluctuant wave  Patient without peritoneal pain with shifting/bumping the bed  MSK: ROM normal and baseline  No deformity  Skin: No new rashes visible  Well perfused  Neuro: Nonfocal  Baseline  CN grossly intact  Moving all four with coordination  Psych: Normal behavior and affect  A:  - Nursing note reviewed      Ddx and MDM  Sinusitis, acute  Decongestants                         No orders to display     Orders Placed This Encounter   Procedures   • COVID/FLU/RSV     Labs Reviewed - No data to display    Final Diagnosis:  1  Nasal congestion    2  Sinusitis        P:  - hospital tx includes   Medications   fluticasone (FLONASE) 50 mcg/act nasal spray 1 spray (1 spray Each Nare Given 12/14/22 2302)   oxymetazoline (AFRIN) 0 05 % nasal spray 2 spray (2 sprays Each Nare Given 12/14/22 2303)   pseudoephedrine (SUDAFED) tablet 60 mg (60 mg Oral Given 12/14/22 2259)   naproxen (NAPROSYN) tablet 500 mg (500 mg Oral Given 12/14/22 2259)         - disposition  Time reflects when diagnosis was documented in both MDM as applicable and the Disposition within this note     Time User Action Codes Description Comment    12/14/2022 10:52 PM Leyla Moulton [R09 81] Nasal congestion     12/14/2022 10:52 PM Leyla Moulton [J32 [id number]] Sinusitis       ED Disposition     ED Disposition   Discharge    Condition   Stable    Date/Time   Wed Dec 14, 2022 10:52 PM    Comment   Jacquelin Hernandez discharge to home/self care  Follow-up Information     Follow up With Specialties Details Why Contact Info    Justin Bueno MD Internal Medicine, Family Medicine   [address]  [address] [facility]  [phone]            - patient will call their PCP to let them know they were in the emergency department   We discuss return precautions       - additional tx intended, if consistent with primary provider:  - patient to follow with :      Discharge Medication List as of 12/14/2022 10:53 PM      START taking these medications    Details   naproxen (Naprosyn) 500 mg tablet Take 1 tablet (500 mg total) by mouth 2 (two) times a day with meals, Starting Wed 12/14/2022, Normal      pseudoephedrine (SUDAFED) 30 mg tablet Take 2 tablets (60 mg total) by mouth every 8 (eight) hours as needed for congestion, Starting Wed 12/14/2022, Normal         CONTINUE these medications which have NOT CHANGED    Details   aspirin (ECOTRIN LOW STRENGTH) 81 mg EC tablet Take 81 mg by mouth daily, Historical Med      clopidogrel (PLAVIX) 75 mg tablet Take 75 mg by mouth daily, Starting 2022, Historical Med      famotidine (PEPCID) 20 mg tablet Take 20 mg by mouth 2 (two) times a day, Historical Med      fluticasone (FLONASE) 50 mcg/act nasal spray 2 sprays into each nostril daily, Starting Fri 6/3/2022, Normal      lisinopril (ZESTRIL) 2 5 mg tablet Take 2 5 mg by mouth daily, Historical Med      metoprolol succinate (TOPROL-XL) 50 mg 24 hr tablet in the morning, Historical Med      rosuvastatin (CRESTOR) 20 MG tablet Take 2 tablets on Sundays, and 1 tablet on the other days of the week  Take it at bedtime, Historical Med           No discharge procedures on file  Prior to Admission Medications   Prescriptions Last Dose Informant Patient Reported? Taking?   aspirin (ECOTRIN LOW STRENGTH) 81 mg EC tablet   Yes No   Sig: Take 81 mg by mouth daily   clopidogrel (PLAVIX) 75 mg tablet   Yes No   Sig: Take 75 mg by mouth daily   famotidine (PEPCID) 20 mg tablet   Yes No   Sig: Take 20 mg by mouth 2 (two) times a day   fluticasone (FLONASE) 50 mcg/act nasal spray   No No   Si sprays into each nostril daily   lisinopril (ZESTRIL) 2 5 mg tablet   Yes No   Sig: Take 2 5 mg by mouth daily   metoprolol succinate (TOPROL-XL) 50 mg 24 hr tablet   Yes No   Sig: in the morning   rosuvastatin (CRESTOR) 20 MG tablet   Yes No   Sig: Take 2 tablets on Sundays, and 1 tablet on the other days of the week  Take it at bedtime      Facility-Administered Medications: None       Portions of the record may have been created with voice recognition software  Occasional wrong word or "sound a like" substitutions may have occurred due to the inherent limitations of voice recognition software  Read the chart carefully and recognize, using context, where substitutions have occurred      Electronically signed by:  MD Maricarmen Corbett MD  12/16/22 1345

## 2023-04-25 ENCOUNTER — OFFICE VISIT (OUTPATIENT)
Dept: FAMILY MEDICINE CLINIC | Facility: CLINIC | Age: 63
End: 2023-04-25

## 2023-04-25 VITALS
TEMPERATURE: 98.2 F | HEIGHT: 71 IN | HEART RATE: 68 BPM | WEIGHT: 192 LBS | RESPIRATION RATE: 16 BRPM | SYSTOLIC BLOOD PRESSURE: 140 MMHG | BODY MASS INDEX: 26.88 KG/M2 | DIASTOLIC BLOOD PRESSURE: 90 MMHG

## 2023-04-25 DIAGNOSIS — I10 ESSENTIAL HYPERTENSION: ICD-10-CM

## 2023-04-25 DIAGNOSIS — I73.9 PAD (PERIPHERAL ARTERY DISEASE) (HCC): ICD-10-CM

## 2023-04-25 DIAGNOSIS — I25.110 CORONARY ARTERY DISEASE INVOLVING NATIVE CORONARY ARTERY OF NATIVE HEART WITH UNSTABLE ANGINA PECTORIS (HCC): ICD-10-CM

## 2023-04-25 DIAGNOSIS — G44.84 PRIMARY EXERTIONAL HEADACHE: Primary | ICD-10-CM

## 2023-04-25 NOTE — PROGRESS NOTES
Assessment/Plan:    Will check CTA given exertional, one sided headache, hx of HTN, CAD    ER precautions for any severe, thunderclap HA  1  Primary exertional headache  -     CTA head and neck w wo contrast; Future; Expected date: 04/25/2023    2  Coronary artery disease involving native coronary artery of native heart with unstable angina pectoris [facility])  Assessment & Plan:  He is on Plavix and ASA  Orders:  -     CTA head and neck w wo contrast; Future; Expected date: 04/25/2023    3  Essential hypertension  Assessment & Plan:  stable    Orders:  -     CTA head and neck w wo contrast; Future; Expected date: 04/25/2023    4  PAD (peripheral artery disease) ([facility] )  -     CTA head and neck w wo contrast; Future; Expected date: 04/25/2023            There are no Patient Instructions on file for this visit  No follow-ups on file  Subjective:      Patient ID: Georges Giraldo is a 63 y o  male  Chief Complaint   Patient presents with   • Headache     C/O headaches for the last week - noted on top left of his head, worse with activity Robina WAGNER       He has been having headaches for the past week and a half  Occur left temporal region, feels like a stabbing/sharp pain  Worse with walking around, bending forward  Has not noticed this when sitting, but has had it in the car  Sometimes very brief, but otherwise prolonged for several hours   Has taken Tylenol, which only helps temporarily  Typically starts midday  He has chronic neck pain  No numbness, tingling, or weakness in extremities  No light headedness, dizzines, or vision changes  The following portions of the patient's history were reviewed and updated as appropriate: allergies, current medications, past family history, past medical history, past social history, past surgical history and problem list     Review of Systems   Constitutional: Negative  HENT: Negative  Eyes: Negative for visual disturbance  Respiratory: Negative  "  Cardiovascular: Negative  Musculoskeletal: Positive for neck pain  Neurological: Positive for headaches  Negative for dizziness, weakness, light-headedness and numbness  Current Outpatient Medications   Medication Sig Dispense Refill   • aspirin (ECOTRIN LOW STRENGTH) 81 mg EC tablet Take 81 mg by mouth daily     • clopidogrel (PLAVIX) 75 mg tablet Take 75 mg by mouth daily     • famotidine (PEPCID) 20 mg tablet Take 20 mg by mouth 2 (two) times a day     • fluticasone (FLONASE) 50 mcg/act nasal spray 2 sprays into each nostril daily 16 g 1   • lisinopril (ZESTRIL) 2 5 mg tablet Take 2 5 mg by mouth daily     • metoprolol succinate (TOPROL-XL) 50 mg 24 hr tablet in the morning     • pseudoephedrine (SUDAFED) 30 mg tablet Take 2 tablets (60 mg total) by mouth every 8 (eight) hours as needed for congestion 30 tablet 0   • rosuvastatin (CRESTOR) 20 MG tablet Take 2 tablets on Sundays, and 1 tablet on the other days of the week  Take it at bedtime       No current facility-administered medications for this visit  Objective:    /90   Pulse 68   Temp 98 2 °F (36 8 °C)   Resp 16   Ht 5' 11\" (1 803 m)   Wt 87 1 kg (192 lb)   BMI 26 78 kg/m²        Physical Exam  Vitals and nursing note reviewed  Constitutional:       Appearance: Normal appearance  He is well-developed  HENT:      Right Ear: Tympanic membrane normal       Left Ear: Tympanic membrane normal       Nose: Nose normal       Mouth/Throat:      Pharynx: Oropharynx is clear  Eyes:      Extraocular Movements: Extraocular movements intact  Conjunctiva/sclera: Conjunctivae normal       Pupils: Pupils are equal, round, and reactive to light  Neck:      Vascular: No carotid bruit  Cardiovascular:      Rate and Rhythm: Normal rate and regular rhythm  Pulses: Normal pulses  Heart sounds: Normal heart sounds  No murmur heard    Pulmonary:      Effort: Pulmonary effort is normal       Breath sounds: Normal breath " sounds  Skin:     General: Skin is warm and dry  Neurological:      Mental Status: He is alert     Psychiatric:         Mood and Affect: Mood normal          Behavior: Behavior normal                 HUMA Patten

## 2023-04-26 ENCOUNTER — TELEPHONE (OUTPATIENT)
Dept: ADMINISTRATIVE | Facility: HOSPITAL | Age: 63
End: 2023-04-26

## 2023-04-26 DIAGNOSIS — I10 ESSENTIAL HYPERTENSION: Primary | ICD-10-CM

## 2023-04-26 NOTE — TELEPHONE ENCOUNTER
Hi Carolynn Pedroza is scheduled for his CTA for Tuesday, 5/2  Pt states he is on daily medication for high BP and has not had any labs done in last 3 mos  Please order BUN/Creat  Pt understands these labs must be done prior to appointment  Clerical staff to call patient when labs are orders, please  Thank you!

## 2023-04-30 LAB
BUN SERPL-MCNC: 14 MG/DL (ref 8–27)
BUN/CREAT SERPL: 13 (ref 10–24)
CALCIUM SERPL-MCNC: 9.8 MG/DL (ref 8.6–10.2)
CHLORIDE SERPL-SCNC: 100 MMOL/L (ref 96–106)
CO2 SERPL-SCNC: 19 MMOL/L (ref 20–29)
CREAT SERPL-MCNC: 1.06 MG/DL (ref 0.76–1.27)
EGFR: 80 ML/MIN/1.73
GLUCOSE SERPL-MCNC: 105 MG/DL (ref 70–99)
POTASSIUM SERPL-SCNC: 4.3 MMOL/L (ref 3.5–5.2)
SODIUM SERPL-SCNC: 139 MMOL/L (ref 134–144)

## 2023-05-02 ENCOUNTER — HOSPITAL ENCOUNTER (OUTPATIENT)
Dept: RADIOLOGY | Facility: HOSPITAL | Age: 63
Discharge: HOME | End: 2023-05-02

## 2023-05-02 DIAGNOSIS — I25.110 CORONARY ARTERY DISEASE INVOLVING NATIVE CORONARY ARTERY OF NATIVE HEART WITH UNSTABLE ANGINA PECTORIS (HCC): ICD-10-CM

## 2023-05-02 DIAGNOSIS — I10 ESSENTIAL HYPERTENSION: ICD-10-CM

## 2023-05-02 DIAGNOSIS — G44.84 PRIMARY EXERTIONAL HEADACHE: ICD-10-CM

## 2023-05-02 DIAGNOSIS — I73.9 PAD (PERIPHERAL ARTERY DISEASE) (HCC): ICD-10-CM

## 2023-05-02 RX ADMIN — IOHEXOL 85 ML: 350 INJECTION, SOLUTION INTRAVENOUS at 09:56

## 2023-08-24 ENCOUNTER — OFFICE VISIT (OUTPATIENT)
Dept: URGENT CARE | Facility: CLINIC | Age: 63
End: 2023-08-24
Payer: COMMERCIAL

## 2023-08-24 VITALS
WEIGHT: 191 LBS | TEMPERATURE: 98.1 F | SYSTOLIC BLOOD PRESSURE: 148 MMHG | OXYGEN SATURATION: 100 % | BODY MASS INDEX: 26.64 KG/M2 | DIASTOLIC BLOOD PRESSURE: 86 MMHG | RESPIRATION RATE: 16 BRPM | HEART RATE: 75 BPM

## 2023-08-24 DIAGNOSIS — B02.9 HERPES ZOSTER WITHOUT COMPLICATION: Primary | ICD-10-CM

## 2023-08-24 PROCEDURE — 99213 OFFICE O/P EST LOW 20 MIN: CPT | Performed by: PHYSICIAN ASSISTANT

## 2023-08-24 NOTE — PROGRESS NOTES
[facility]        NAME: Jered Avila is a 66 y.o. male  : 1960    MRN: [id number]  DATE: 2023  TIME: 4:46 PM    Assessment and Plan   Herpes zoster without complication [H69.5]  1. Herpes zoster without complication          Because symptoms began a week ago there is no role for antiviral medication. Recommend continue tylenol and keeping lesions clean and dry. Should fu with pcp next week. Discussed strict return to care precautions as well as red flag symptoms which should prompt immediate ED referral. Pt verbalized understanding and is in agreement with plan. Please follow up with your primary care provider within the next week. Please remember that your visit today was with an urgent care provider and should not replace follow up with your primary care provider for chronic medical issues or annual physicals. Patient Instructions       Follow up with PCP in 3-5 days. Proceed to  ER if symptoms worsen. Chief Complaint     Chief Complaint   Patient presents with   • Headache     Pt presents with right side head pain, Hx of headaches; right ear pain; started one week ago         History of Present Illness       Patient is a 60-year-old male with past medical history of CAD, COPD, hypertension, GERD, chronic headaches presenting with right-sided headache, right ear pain, and rash x1 week. Became concerned because the headaches are usually on his left side. Has been taking Tylenol for his symptoms which does provide moderate relief. States the pain feels like "stabbing" and "electric."  No history of same. Did have chickenpox as a kid. Denies dizziness, neck pain, visual changes, nausea/vomiting, URI symptoms, chest pain, shortness of breath. Review of Systems   Review of Systems   Constitutional: Negative for chills, diaphoresis and fever. HENT: Positive for ear pain. Negative for congestion, rhinorrhea and sore throat.     Eyes: Negative for discharge and itching. Respiratory: Negative for cough, chest tightness, shortness of breath and wheezing. Cardiovascular: Negative for chest pain. Gastrointestinal: Negative for diarrhea, nausea and vomiting. Musculoskeletal: Negative for myalgias. Skin: Positive for rash. Neurological: Positive for headaches. Negative for dizziness, facial asymmetry, weakness and numbness. Current Medications       Current Outpatient Medications:   •  aspirin (ECOTRIN LOW STRENGTH) 81 mg EC tablet, Take 81 mg by mouth daily, Disp: , Rfl:   •  clopidogrel (PLAVIX) 75 mg tablet, Take 75 mg by mouth daily, Disp: , Rfl:   •  famotidine (PEPCID) 20 mg tablet, Take 20 mg by mouth 2 (two) times a day, Disp: , Rfl:   •  lisinopril (ZESTRIL) 2.5 mg tablet, Take 2.5 mg by mouth daily, Disp: , Rfl:   •  metoprolol succinate (TOPROL-XL) 50 mg 24 hr tablet, in the morning, Disp: , Rfl:   •  pseudoephedrine (SUDAFED) 30 mg tablet, Take 2 tablets (60 mg total) by mouth every 8 (eight) hours as needed for congestion, Disp: 30 tablet, Rfl: 0  •  rosuvastatin (CRESTOR) 20 MG tablet, Take 2 tablets on Sundays, and 1 tablet on the other days of the week.   Take it at bedtime, Disp: , Rfl:   •  fluticasone (FLONASE) 50 mcg/act nasal spray, 2 sprays into each nostril daily (Patient not taking: Reported on 8/24/2023), Disp: 16 g, Rfl: 1    Current Allergies     Allergies as of 08/24/2023 - Reviewed 08/24/2023   Allergen Reaction Noted   • Orange oil - food allergy Vomiting 03/18/2020            The following portions of the patient's history were reviewed and updated as appropriate: allergies, current medications, past family history, past medical history, past social history, past surgical history and problem list.     Past Medical History:   Diagnosis Date   • Complete tear of rotator cuff 04/01/2008   • COPD (chronic obstructive pulmonary disease) ([address]) 12/17/2021   • Coronary artery disease    • GERD (gastroesophageal reflux disease)    • Hx of heart artery stent    • Hypertension    • MI (myocardial infarction) [facility])        Past Surgical History:   Procedure Laterality Date   • CARDIAC SURGERY         Family History   Problem Relation Age of Onset   • Leukemia Mother    • Cancer Mother    • Cancer Father          Medications have been verified. Objective   /86   Pulse 75   Temp 98.1 °F (36.7 °C)   Resp 16   Wt 86.6 kg (191 lb)   SpO2 100%   BMI 26.64 kg/m²        Physical Exam     Physical Exam  Vitals and nursing note reviewed. Constitutional:       General: He is not in acute distress. Appearance: Normal appearance. He is not ill-appearing. HENT:      Head: Normocephalic and atraumatic. Right Ear: Tympanic membrane and ear canal normal.      Nose: Nose normal.      Mouth/Throat:      Mouth: Mucous membranes are moist.      Pharynx: Oropharynx is clear. Cardiovascular:      Rate and Rhythm: Normal rate. Pulmonary:      Effort: Pulmonary effort is normal. No respiratory distress. Skin:     General: Skin is warm and dry. Capillary Refill: Capillary refill takes less than 2 seconds. Neurological:      Mental Status: He is alert and oriented to person, place, and time.    Psychiatric:         Behavior: Behavior normal.

## 2023-08-28 ENCOUNTER — OFFICE VISIT (OUTPATIENT)
Dept: FAMILY MEDICINE CLINIC | Facility: CLINIC | Age: 63
End: 2023-08-28
Payer: COMMERCIAL

## 2023-08-28 VITALS
WEIGHT: 191.8 LBS | HEART RATE: 57 BPM | DIASTOLIC BLOOD PRESSURE: 84 MMHG | RESPIRATION RATE: 16 BRPM | BODY MASS INDEX: 26.85 KG/M2 | SYSTOLIC BLOOD PRESSURE: 140 MMHG | TEMPERATURE: 97.8 F | HEIGHT: 71 IN

## 2023-08-28 DIAGNOSIS — E78.5 HYPERLIPIDEMIA LDL GOAL <100: ICD-10-CM

## 2023-08-28 DIAGNOSIS — I25.110 CORONARY ARTERY DISEASE INVOLVING NATIVE CORONARY ARTERY OF NATIVE HEART WITH UNSTABLE ANGINA PECTORIS (HCC): ICD-10-CM

## 2023-08-28 DIAGNOSIS — B02.29 POST HERPETIC NEURALGIA: Primary | ICD-10-CM

## 2023-08-28 PROCEDURE — 99214 OFFICE O/P EST MOD 30 MIN: CPT | Performed by: NURSE PRACTITIONER

## 2023-08-28 RX ORDER — GABAPENTIN 300 MG/1
300 CAPSULE ORAL
Qty: 30 CAPSULE | Refills: 1 | Status: SHIPPED | OUTPATIENT
Start: 2023-08-28

## 2023-08-28 NOTE — PATIENT INSTRUCTIONS
Gabapentin (By mouth)   Gabapentin (romina-a-PEN-tin)  Treats seizures and pain caused by shingles. Brand Name(s): FusePaq Fanatrex, Neurontin   There may be other brand names for this medicine. When This Medicine Should Not Be Used: This medicine is not right for everyone. Do not use it if you had an allergic reaction to gabapentin. How to Use This Medicine:   Capsule, Liquid, Tablet  Take your medicine as directed. Your dose may need to be changed several times to find what works best for you. If you have epilepsy, do not allow more than 12 hours to pass between doses. Capsule: Swallow the capsule whole with plenty of water. Do not open, crush, or chew it. Gralise® tablet: Swallow the tablet whole . Do not crush, break, or chew it. Neurontin® tablet: If you break a tablet into 2 pieces, use the second half as your next dose. Do not use the half-tablet if the whole tablet has been cut or broken after 28 days. Oral liquid: Measure the oral liquid medicine with a marked measuring spoon, oral syringe, or medicine cup. This medicine should come with a Medication Guide. Ask your pharmacist for a copy if you do not have one. Missed dose: Take a dose as soon as you remember. If it is almost time for your next dose, wait until then and take a regular dose. Do not take extra medicine to make up for a missed dose. Store the medicine in a closed container at room temperature, away from heat, moisture, and direct light. Store the Neurontin® oral liquid in the refrigerator. Do not freeze. Drugs and Foods to Avoid:   Ask your doctor or pharmacist before using any other medicine, including over-the-counter medicines, vitamins, and herbal products. Some medicines can affect how gabapentin works. Tell your doctor if you also using hydrocodone or morphine. If you take an antacid, wait at least 2 hours before you take gabapentin. Do not drink alcohol while you are using this medicine.   Tell your doctor if you use anything else that makes you sleepy. Some examples are allergy medicine, narcotic pain medicine, and alcohol. Tell your doctor if you are also using lorazepam, oxycodone, or zolpidem. Warnings While Using This Medicine:   Tell your doctor if you are pregnant or breastfeeding, or if you have kidney problems (including patients receiving dialysis) or lung problems. Tell your doctor if you have a history of depression or mental health problems. This medicine may cause the following problems:  Drug reaction with eosinophilia and systemic symptoms (DRESS) or multiorgan hypersensitivity, which may damage the liver, kidney, blood, heart, or muscles  Changes in mood or behavior, including suicidal thoughts or behavior  Respiratory depression (serious breathing problem that can be life-threatening), when used with narcotic pain medicines  Do not stop using this medicine suddenly. Your doctor will need to slowly decrease your dose before you stop it completely. This medicine may make you dizzy or drowsy. Do not drive or do anything else that could be dangerous until you know how this medicine affects you. Tell any doctor or dentist who treats you that you are using this medicine. This medicine may affect certain medical test results. Your doctor will check your progress and the effects of this medicine at regular visits. Keep all appointments. Keep all medicine out of the reach of children. Never share your medicine with anyone. Possible Side Effects While Using This Medicine:   Call your doctor right away if you notice any of these side effects:   Allergic reaction: Itching or hives, swelling in your face or hands, swelling or tingling in your mouth or throat, chest tightness, trouble breathing  Behavior problems, aggression, restlessness, trouble concentrating, moodiness (especially in children)  Blistering, peeling, red skin rash  Blue lips, fingernails, or skin, chest pain, fast heartbeat, trouble breathing  Change in how much or how often you urinate, bloody or cloudy urine  Dark urine or pale stools, nausea, vomiting, loss of appetite, stomach pain, yellow skin or eyes  Fever, chills, cough, sore throat, body aches  Problems with coordination, shakiness, unsteadiness, unusual eye movement  Rapid weight gain, swelling in your hands, ankles, or feet  Rash, swollen or tender glands in the neck, armpit, or groin  Unusual moods or behaviors, thoughts of hurting yourself, feeling depressed  If you notice these less serious side effects, talk with your doctor:   Dizziness, drowsiness, sleepiness, tiredness  If you notice other side effects that you think are caused by this medicine, tell your doctor. Call your doctor for medical advice about side effects. You may report side effects to FDA at 5-268-FDA-2902  © Copyright Delisa Luke 2022 Information is for End User's use only and may not be sold, redistributed or otherwise used for commercial purposes. The above information is an  only. It is not intended as medical advice for individual conditions or treatments. Talk to your doctor, nurse or pharmacist before following any medical regimen to see if it is safe and effective for you.

## 2023-08-28 NOTE — PROGRESS NOTES
Assessment/Plan:    1. Post herpetic neuralgia  -     gabapentin (Neurontin) 300 mg capsule; Take 1 capsule (300 mg total) by mouth daily at bedtime    2. Coronary artery disease involving native coronary artery of native heart with unstable angina pectoris [facility])  Assessment & Plan:  Complaint with regimen and managed by cardiologist      3. Hyperlipidemia LDL goal <100  Assessment & Plan:  Complaint with statin and tolerating it well                Patient Instructions:  Supportive care discussed and advised. Advised to RTO for any worsening and no improvement. Follow up for no improvement and worsening of conditions. Patient advised and educated when to see immediate medical care. Return if symptoms worsen or fail to improve. No future appointments. Subjective:      Patient ID: Kris Haro is a 66 y.o. male. Chief Complaint   Patient presents with   • Follow-up     [place]renna f/u Meryle Fear, LPN           Vitals:  /84   Pulse 57   Temp 97.8 °F (36.6 °C)   Resp 16   Ht 5' 11" (1.803 m)   Wt 87 kg (191 lb 12.8 oz)   BMI 26.75 kg/m²     HPI  Patient was seen at urgent care on 8/24/2023 for herpes on right side of face and stated that was started week before and no anti-viral was ordered. Stated that rash is drying out but having lot of pain on right side of face and having right sided headache. Denies fever, chills and sob. Stated that has not seen ophthalmologist yet and advised to follow with them. Taking tylenol for pain and hesitant to pain medications due to his heart issues and will start on low dose gabapentin and will continue same dose and if no relief in pain in a week then will follow back in office  Complaint with his chronic medications.           The following portions of the patient's history were reviewed and updated as appropriate: allergies, current medications, past family history, past medical history, past social history, past surgical history and problem list.      Review of Systems   Constitutional: Negative for chills, diaphoresis, fatigue, fever and unexpected weight change. HENT: Negative for congestion, dental problem, drooling, ear discharge, ear pain, facial swelling, hearing loss, mouth sores, nosebleeds, postnasal drip, rhinorrhea, sinus pressure, sinus pain, sneezing, sore throat, tinnitus, trouble swallowing and voice change. As noted in HPI     Respiratory: Negative. Negative for cough, chest tightness, shortness of breath and wheezing. Cardiovascular: Negative. Gastrointestinal: Negative. Negative for abdominal pain, constipation, diarrhea, nausea and vomiting. Skin: Positive for rash. Neurological: Positive for headaches. Negative for dizziness and light-headedness. Objective:    Social History     Tobacco Use   Smoking Status Former   • Packs/day: 1.50   • Years: 40.00   • Total pack years: 60.00   • Types: Cigarettes   • Start date: 38   • Quit date: 10/11/2017   • Years since quittin.8   • Passive exposure: Past   Smokeless Tobacco Never   Tobacco Comments    per allscripts current every day smoker       Allergies:    Allergies   Allergen Reactions   • Orange Oil - Food Allergy Vomiting         Current Outpatient Medications   Medication Sig Dispense Refill   • aspirin (ECOTRIN LOW STRENGTH) 81 mg EC tablet Take 81 mg by mouth daily     • clopidogrel (PLAVIX) 75 mg tablet Take 75 mg by mouth daily     • famotidine (PEPCID) 20 mg tablet Take 20 mg by mouth 2 (two) times a day     • fluticasone (FLONASE) 50 mcg/act nasal spray 2 sprays into each nostril daily (Patient taking differently: 2 sprays into each nostril as needed) 16 g 1   • gabapentin (Neurontin) 300 mg capsule Take 1 capsule (300 mg total) by mouth daily at bedtime 30 capsule 1   • lisinopril (ZESTRIL) 2.5 mg tablet Take 2.5 mg by mouth daily     • metoprolol succinate (TOPROL-XL) 50 mg 24 hr tablet in the morning     • rosuvastatin (CRESTOR) 20 MG tablet Take 2 tablets on Sundays, and 1 tablet on the other days of the week. Take it at bedtime       No current facility-administered medications for this visit. Physical Exam  Constitutional:       Appearance: Normal appearance. HENT:      Head: Normocephalic. Nose: Nose normal.   Eyes:      Conjunctiva/sclera: Conjunctivae normal.   Cardiovascular:      Rate and Rhythm: Normal rate and regular rhythm. Heart sounds: Normal heart sounds. Pulmonary:      Effort: Pulmonary effort is normal.      Breath sounds: Normal breath sounds. Musculoskeletal:         General: No swelling or tenderness. Normal range of motion. Skin:     General: Skin is warm and dry. Findings: Rash (dried scattered vesicles on right side of face and lip) present. Neurological:      Mental Status: He is alert and oriented to person, place, and time. Psychiatric:         Mood and Affect: Mood normal.         Behavior: Behavior normal.         Thought Content:  Thought content normal.         Judgment: Judgment normal.                     HeatherHUMA Lerner

## 2023-10-26 ENCOUNTER — OFFICE VISIT (OUTPATIENT)
Dept: FAMILY MEDICINE CLINIC | Facility: CLINIC | Age: 63
End: 2023-10-26
Payer: COMMERCIAL

## 2023-10-26 VITALS
RESPIRATION RATE: 18 BRPM | DIASTOLIC BLOOD PRESSURE: 76 MMHG | TEMPERATURE: 97.6 F | WEIGHT: 194.4 LBS | HEIGHT: 71 IN | SYSTOLIC BLOOD PRESSURE: 124 MMHG | BODY MASS INDEX: 27.22 KG/M2 | HEART RATE: 72 BPM

## 2023-10-26 DIAGNOSIS — I10 ESSENTIAL HYPERTENSION: ICD-10-CM

## 2023-10-26 DIAGNOSIS — E78.5 HYPERLIPIDEMIA LDL GOAL <100: ICD-10-CM

## 2023-10-26 DIAGNOSIS — J01.90 ACUTE SINUSITIS, RECURRENCE NOT SPECIFIED, UNSPECIFIED LOCATION: Primary | ICD-10-CM

## 2023-10-26 PROCEDURE — 99214 OFFICE O/P EST MOD 30 MIN: CPT | Performed by: NURSE PRACTITIONER

## 2023-10-26 RX ORDER — AMOXICILLIN 875 MG/1
875 TABLET, COATED ORAL 2 TIMES DAILY
Qty: 20 TABLET | Refills: 0 | Status: SHIPPED | OUTPATIENT
Start: 2023-10-26 | End: 2023-11-05

## 2023-10-26 NOTE — PATIENT INSTRUCTIONS
Amoxicillin (By mouth)   Amoxicillin (r-oou-m-MADHAVI-in)  Treats infections or stomach ulcers. This medicine is a penicillin antibiotic. Brand Name(s): Moxatasusana Prevpac   There may be other brand names for this medicine. When This Medicine Should Not Be Used: This medicine is not right for everyone. You should not use it if you had an allergic reaction to amoxicillin, any type of penicillin, or a cephalosporin antibiotic. How to Use This Medicine:   Capsule, Liquid, Tablet, Chewable Tablet, Long Acting Tablet  Your doctor will tell you how much medicine to use. Do not use more than directed. Chewable tablet: You must chew the tablet before you swallow it. You may crush the tablet and mix the medicine with a small amount of food to make it easier to swallow. Oral liquid: Shake well just before each use. Measure the oral liquid medicine with a marked measuring spoon, oral syringe, or medicine cup. You may mix the oral liquid with a baby formula, milk, fruit juice, water, ginger ale, or another cold drink. Be sure your child drinks all of the mixture right away. Tablet for suspension: Place the tablet in a small drinking glass, and add 2 teaspoons of water. Do not use any other liquid. Gently stir or swirl the water in the glass until the tablet is completely dissolved. Drink all of this mixture right away. Add more water to the glass and drink all of it to make sure you get all of the medicine. Do not chew or swallow the tablet for suspension. Take all of the medicine in your prescription to clear up your infection, even if you feel better after the first few doses. Take a dose as soon as you remember. If it is almost time for your next dose, wait until then and take a regular dose. Do not take extra medicine to make up for a missed dose. Store the tablets, capsules, and tablets for suspension at room temperature, away from heat, moisture, and direct light. Store the oral liquid in the refrigerator.  Do not freeze. Throw away any unused medicine after 14 days. Drugs and Foods to Avoid:   Ask your doctor or pharmacist before using any other medicine, including over-the-counter medicines, vitamins, and herbal products. Some medicines can affect how amoxicillin works. Tell your doctor if you are also using any of the following:   Allopurinol  Probenecid  Birth control pills  A blood thinner  Warnings While Using This Medicine:   Tell your doctor if you are pregnant or breastfeeding, or if you have kidney disease, allergies, or a condition called phenylketonuria (PKU). Tell your doctor if you are on dialysis. This medicine can cause diarrhea. Call your doctor if the diarrhea becomes severe, does not stop, or is bloody. Do not take any medicine to stop diarrhea until you have talked to your doctor. Diarrhea can occur 2 months or more after you stop taking this medicine. Tell any doctor or dentist who treats you that you are using this medicine. This medicine may affect certain medical test results. Call your doctor if your symptoms do not improve or if they get worse. Use this medicine to treat only the infection your doctor has prescribed it for. Do not use this medicine for any infection or condition that has not been checked by a doctor. This medicine will not treat the flu or the common cold. Keep all medicine out of the reach of children. Never share your medicine with anyone. Possible Side Effects While Using This Medicine:   Call your doctor right away if you notice any of these side effects:   Allergic reaction: Itching or hives, swelling in your face or hands, swelling or tingling in your mouth or throat, chest tightness, trouble breathing  Blistering, peeling, or red skin rash  Diarrhea that may contain blood, stomach cramps, fever  If you notice these less serious side effects, talk with your doctor:   Mild diarrhea, nausea, or vomiting  Mild skin rash  If you notice other side effects that you think are caused by this medicine, tell your doctor. Call your doctor for medical advice about side effects. You may report side effects to FDA at 7-438-KYG-5575  © Copyright Sandi Sahni 2023 Information is for End User's use only and may not be sold, redistributed or otherwise used for commercial purposes. The above information is an  only. It is not intended as medical advice for individual conditions or treatments. Talk to your doctor, nurse or pharmacist before following any medical regimen to see if it is safe and effective for you.

## 2023-10-26 NOTE — PROGRESS NOTES
Assessment/Plan:    1. Acute sinusitis, recurrence not specified, unspecified location  -     amoxicillin (AMOXIL) 875 mg tablet; Take 1 tablet (875 mg total) by mouth 2 (two) times a day for 10 days    2. Essential hypertension  Assessment & Plan:  Stable with current regimen      3. Hyperlipidemia LDL goal <100  Assessment & Plan:  Complaint with statin and tolerating it well            Patient Instructions:  Supportive care discussed and advised. Advised to RTO for any worsening and no improvement. Follow up for no improvement and worsening of conditions. Patient advised and educated when to see immediate medical care. Take medication with food. It is important that you take the entire course of antibiotics prescribed. May also take a probiotic of your choice to maintain healthy GI anita. Can take some probiotic and yogurt with the medication. Return if symptoms worsen or fail to improve. No future appointments. Subjective:      Patient ID: Cristina Bunn is a 66 y.o. male. Chief Complaint   Patient presents with   • Nasal Congestion     Symptoms started 2-3 days ago [place], [place]     • Cough         Vitals:  /76   Pulse 72   Temp 97.6 °F (36.4 °C)   Resp 18   Ht 5' 11" (1.803 m)   Wt 88.2 kg (194 lb 6.4 oz)   BMI 27.11 kg/m²     Patient stated that started with congestion, sinus pressure and cough from couple of days. Denies fever, chills and sob. Taking OTC but no relief as also taking flonase nasal spray. Complaint with medications and tolerating it well. Under care of cardiologist and follows with him regularly      Cough  Pertinent negatives include no chills, ear pain, fever, headaches, postnasal drip, rhinorrhea, sore throat, shortness of breath or wheezing.              PHQ-2/9 Depression Screening    Little interest or pleasure in doing things: 0 - not at all  Feeling down, depressed, or hopeless: 0 - not at all  PHQ-2 Score: 0  PHQ-2 Interpretation: Negative depression screen             The following portions of the patient's history were reviewed and updated as appropriate: allergies, current medications, past family history, past medical history, past social history, past surgical history and problem list.      Review of Systems   Constitutional:  Negative for chills, diaphoresis, fatigue, fever and unexpected weight change. HENT:  Positive for congestion and sinus pressure. Negative for dental problem, drooling, ear discharge, ear pain, facial swelling, hearing loss, mouth sores, nosebleeds, postnasal drip, rhinorrhea, sinus pain, sneezing, sore throat, tinnitus, trouble swallowing and voice change. Respiratory:  Positive for cough. Negative for chest tightness, shortness of breath and wheezing. Cardiovascular: Negative. Gastrointestinal:  Negative for abdominal pain, constipation, diarrhea, nausea and vomiting. Musculoskeletal: Negative. Skin: Negative. Neurological:  Negative for dizziness, light-headedness and headaches. Objective:    Social History     Tobacco Use   Smoking Status Former   • Packs/day: 1.50   • Years: 40.00   • Total pack years: 60.00   • Types: Cigarettes   • Start date: 38   • Quit date: 10/11/2017   • Years since quittin.0   • Passive exposure: Past   Smokeless Tobacco Never   Tobacco Comments    per allscripts current every day smoker       Allergies:    Allergies   Allergen Reactions   • Orange Oil - Food Allergy Vomiting         Current Outpatient Medications   Medication Sig Dispense Refill   • amoxicillin (AMOXIL) 875 mg tablet Take 1 tablet (875 mg total) by mouth 2 (two) times a day for 10 days 20 tablet 0   • aspirin (ECOTRIN LOW STRENGTH) 81 mg EC tablet Take 81 mg by mouth daily     • clopidogrel (PLAVIX) 75 mg tablet Take 75 mg by mouth daily     • famotidine (PEPCID) 20 mg tablet Take 20 mg by mouth if needed     • fluticasone (FLONASE) 50 mcg/act nasal spray 2 sprays into each nostril daily (Patient taking differently: 2 sprays into each nostril as needed) 16 g 1   • lisinopril (ZESTRIL) 2.5 mg tablet Take 2.5 mg by mouth daily     • metoprolol succinate (TOPROL-XL) 50 mg 24 hr tablet in the morning     • rosuvastatin (CRESTOR) 20 MG tablet Take 2 tablets on Sundays, and 1 tablet on the other days of the week. Take it at bedtime       No current facility-administered medications for this visit. Physical Exam  Vitals reviewed. Constitutional:       Appearance: Normal appearance. He is well-developed. HENT:      Head: Normocephalic. Right Ear: Tympanic membrane, ear canal and external ear normal.      Left Ear: Tympanic membrane, ear canal and external ear normal.      Nose: Mucosal edema present. Right Sinus: Maxillary sinus tenderness present. No frontal sinus tenderness. Left Sinus: Maxillary sinus tenderness present. No frontal sinus tenderness. Mouth/Throat:      Mouth: No oral lesions. Pharynx: No oropharyngeal exudate or posterior oropharyngeal erythema. Cardiovascular:      Rate and Rhythm: Normal rate and regular rhythm. Heart sounds: Normal heart sounds. Pulmonary:      Effort: Pulmonary effort is normal.      Breath sounds: Normal breath sounds. Musculoskeletal:         General: Normal range of motion. Cervical back: Neck supple. Lymphadenopathy:      Cervical:      Right cervical: No superficial or posterior cervical adenopathy. Left cervical: No superficial or posterior cervical adenopathy. Skin:     General: Skin is warm and dry. Neurological:      Mental Status: He is alert and oriented to person, place, and time. Psychiatric:         Behavior: Behavior normal.         Thought Content:  Thought content normal.         Judgment: Judgment normal.                     HeatherHUMA Lerner